# Patient Record
Sex: FEMALE | Race: BLACK OR AFRICAN AMERICAN | NOT HISPANIC OR LATINO | Employment: FULL TIME | URBAN - METROPOLITAN AREA
[De-identification: names, ages, dates, MRNs, and addresses within clinical notes are randomized per-mention and may not be internally consistent; named-entity substitution may affect disease eponyms.]

---

## 2017-02-09 DIAGNOSIS — Z12.11 SCREENING FOR COLORECTAL CANCER: Primary | ICD-10-CM

## 2017-02-09 DIAGNOSIS — Z12.12 SCREENING FOR COLORECTAL CANCER: Primary | ICD-10-CM

## 2017-02-24 ENCOUNTER — OFFICE VISIT (OUTPATIENT)
Dept: GASTROENTEROLOGY | Facility: CLINIC | Age: 52
End: 2017-02-24
Payer: COMMERCIAL

## 2017-02-24 VITALS
DIASTOLIC BLOOD PRESSURE: 84 MMHG | HEART RATE: 79 BPM | BODY MASS INDEX: 54.53 KG/M2 | WEIGHT: 277.75 LBS | HEIGHT: 60 IN | SYSTOLIC BLOOD PRESSURE: 130 MMHG

## 2017-02-24 DIAGNOSIS — Z12.11 COLON CANCER SCREENING: Primary | ICD-10-CM

## 2017-02-24 PROCEDURE — 99499 UNLISTED E&M SERVICE: CPT | Mod: S$GLB,,, | Performed by: PHYSICIAN ASSISTANT

## 2017-02-24 PROCEDURE — 99999 PR PBB SHADOW E&M-EST. PATIENT-LVL III: CPT | Mod: PBBFAC,,, | Performed by: PHYSICIAN ASSISTANT

## 2017-02-24 RX ORDER — SODIUM, POTASSIUM,MAG SULFATES 17.5-3.13G
SOLUTION, RECONSTITUTED, ORAL ORAL
Qty: 354 ML | Refills: 0 | Status: SHIPPED | OUTPATIENT
Start: 2017-02-24 | End: 2018-01-04

## 2017-02-24 RX ORDER — LOSARTAN POTASSIUM 100 MG/1
100 TABLET ORAL
COMMUNITY
Start: 2017-01-19 | End: 2018-01-04

## 2017-02-24 NOTE — PROGRESS NOTES
Subjective:       Patient ID: Ingrid Johnson is a 52 y.o. female.    Chief Complaint: Colonoscopy    HPI   The patient presented to the GI clinic today for initial evaluation. She is here to discuss colon cancer screening. She has never had a colonoscopy. The patient denies hematochezia, melena, change in bowel habits, weight loss, change in appetite, abdominal pain, nausea, vomiting, heartburn or dysphagia. She denies a family history of colon cancer.     Review of Systems   Constitutional: Negative for fever.   HENT: Negative for hearing loss.    Eyes: Negative for visual disturbance.   Respiratory: Positive for shortness of breath (occasional due to deconditioning). Negative for cough.    Cardiovascular: Negative for chest pain.   Gastrointestinal:        As per HPI.   Genitourinary: Negative for dysuria, frequency and hematuria.   Musculoskeletal: Positive for back pain. Negative for arthralgias.   Skin: Negative for rash.   Neurological: Negative for seizures, syncope, numbness and headaches.   Hematological: Does not bruise/bleed easily.   Psychiatric/Behavioral: The patient is not nervous/anxious.        Objective:      Physical Exam   Constitutional: She is oriented to person, place, and time. Vital signs are normal. She appears well-developed and well-nourished.   HENT:   Head: Normocephalic and atraumatic.   Eyes: EOM are normal.   Neck: Normal range of motion. Neck supple. Carotid bruit is not present.   Cardiovascular: Normal rate and regular rhythm.    No murmur heard.  Pulmonary/Chest: Effort normal and breath sounds normal. No respiratory distress. She has no wheezes.   Abdominal: Soft. Normal appearance and bowel sounds are normal. She exhibits no distension and no mass. There is no tenderness.   Musculoskeletal: She exhibits no edema.   Neurological: She is alert and oriented to person, place, and time. No cranial nerve deficit.   Skin: Skin is warm and dry. No rash noted.   Psychiatric: Her  behavior is normal.       Assessment:       1. Colon cancer screening        Plan:       1. Colonoscopy.   The risks, benefits, alternatives and possible complications of the above procedure(s) were discussed with the patient to include but not limited to infection, bleeding, heart complications, respiratory complications, or perforation which may require surgical intervention. The patient's questions were answered. The patient verbally reported understanding of the discussion.  2. Further recommendations after above.   3. Follow up with PCP as previously scheduled.     Thank you for the opportunity to participate in the care of this patient. This consult was designated to me by my supervising physician. A report will be sent to the referring provider.   Jung Hernández PA-C.

## 2017-02-24 NOTE — PATIENT INSTRUCTIONS
Colonoscopy     A camera attached to a flexible tube with a viewing lens is used to take video pictures.     Colonoscopy is a test to view the inside of your lower digestive tract (colon and rectum). Sometimes it can show the last part of the small intestine (ileum). During the test, small pieces of tissue may be removed for testing. This is called a biopsy. Small growths, such as polyps, may also be removed.   Why is colonoscopy done?  The test is done to help look for colon cancer. And it can help find the source of abdominal pain, bleeding, and changes in bowel habits. It may be needed once a year, depending on factors such as your:  · Age  · Health history  · Family health history  · Symptoms  · Results from any prior colonoscopy  Risks and possible complications  These include:  · Bleeding               · A puncture or tear in the colon   · Risks of anesthesia  · A cancer lesion not being seen  Getting ready   To prepare for the test:  · Talk with your healthcare provider about the risks of the test (see below). Also ask your healthcare provider about alternatives to the test.  · Tell your healthcare provider about any medicines you take. Also tell him or her about any health conditions you may have.  · Make sure your rectum and colon are empty for the test. Follow the diet and bowel prep instructions exactly. If you dont, the test may need to be rescheduled.  · Plan for a friend or family member to drive you home after the test.     Colonoscopy provides an inside view of the entire colon.     You may discuss the results with your doctor right away or at a future visit.  During the test   The test is usually done in the hospital on an outpatient basis. This means you go home the same day. The procedure takes about 30 minutes. During that time:  · You are given relaxing (sedating) medicine through an IV line. You may be drowsy, or fully asleep.  · The healthcare provider will first give you a physical exam to  check for anal and rectal problems.  · Then the anus is lubricated and the scope inserted.  · If you are awake, you may have a feeling similar to needing to have a bowel movement. You may also feel pressure as air is pumped into the colon. Its OK to pass gas during the procedure.  · Biopsy, polyp removal, or other treatments may be done during the test.  After the test   You may have gas right after the test. It can help to try to pass it to help prevent later bloating. Your healthcare provider may discuss the results with you right away. Or you may need to schedule a follow-up visit to talk about the results. After the test, you can go back to your normal eating and other activities. You may be tired from the sedation and need to rest for a few hours.  Date Last Reviewed: 11/1/2016  © 8979-0601 [employer]. [address] [place] [address], [place] [address]. All rights reserved. This information is not intended as a substitute for professional medical care. Always follow your healthcare professional's instructions.

## 2017-02-24 NOTE — MR AVS SNAPSHOT
[facility]  [address] [facility]  [place] [address]  Phone: [phone]  Fax: [phone]                  Ingrid Johnson   2017 9:30 AM   Office Visit    Description:  Female : 1965   Provider:  Jung Hernández PA-C   Department:  [facility]           Reason for Visit     Colonoscopy           Diagnoses this Visit        Comments    Colon cancer screening    -  Primary            To Do List           Goals (5 Years of Data)     None      Follow-Up and Disposition     Return if symptoms worsen or fail to improve.       These Medications        Disp Refills Start End    SUPREP BOWEL PREP KIT 17.5-3.13-1.6 gram SolR 354 mL 0 2017     Use as directed    Pharm[place]: [address] [place] [id number] [facility] #: [phone]         [facility]     [facility] Care Line -  Assistance  Registered nurses in the [facility] provide clinical advisement, health education, appointment booking, and other advisory services.  Call for this free service at [phone].             Medications           START taking these NEW medications        Refills    SUPREP BOWEL PREP KIT 17.5-3.13-1.6 gram SolR 0    Sig: Use as directed    Class: Normal           Verify that the below list of medications is an accurate representation of the medications you are currently taking.  If none reported, the list may be blank. If incorrect, please contact your healthcare provider. Carry this list with you in case of emergency.           Current Medications     atenolol-chlorthalidone (TENORETIC) 50-25 mg Tab Take 1 tablet by mouth every morning.     furosemide (LASIX) 20 MG tablet Take 20 mg by mouth 2 (two) times daily.    losartan (COZAAR) 100 MG tablet Take 100 mg by mouth.    SUPREP BOWEL PREP KIT 17.5-3.13-1.6 gram SolR Use as directed           Clinical Reference Information           Your Vitals Were     BP                    130/84           Blood Pressure          Most Recent Value    BP  130/84      Allergies as of 2/24/2017     No Known Allergies      Immunizations Administered on Date of Encounter - 2/24/2017     None      Instructions      Colonoscopy     A camera attached to a flexible tube with a viewing lens is used to take video pictures.     Colonoscopy is a test to view the inside of your lower digestive tract (colon and rectum). Sometimes it can show the last part of the small intestine (ileum). During the test, small pieces of tissue may be removed for testing. This is called a biopsy. Small growths, such as polyps, may also be removed.   Why is colonoscopy done?  The test is done to help look for colon cancer. And it can help find the source of abdominal pain, bleeding, and changes in bowel habits. It may be needed once a year, depending on factors such as your:  · Age  · Health history  · Family health history  · Symptoms  · Results from any prior colonoscopy  Risks and possible complications  These include:  · Bleeding               · A puncture or tear in the colon   · Risks of anesthesia  · A cancer lesion not being seen  Getting ready   To prepare for the test:  · Talk with your healthcare provider about the risks of the test (see below). Also ask your healthcare provider about alternatives to the test.  · Tell your healthcare provider about any medicines you take. Also tell him or her about any health conditions you may have.  · Make sure your rectum and colon are empty for the test. Follow the diet and bowel prep instructions exactly. If you dont, the test may need to be rescheduled.  · Plan for a friend or family member to drive you home after the test.     Colonoscopy provides an inside view of the entire colon.     You may discuss the results with your doctor right away or at a future visit.  During the test   The test is usually done in the hospital on an outpatient basis. This means you go  home the same day. The procedure takes about 30 minutes. During that time:  · You are given relaxing (sedating) medicine through an IV line. You may be drowsy, or fully asleep.  · The healthcare provider will first give you a physical exam to check for anal and rectal problems.  · Then the anus is lubricated and the scope inserted.  · If you are awake, you may have a feeling similar to needing to have a bowel movement. You may also feel pressure as air is pumped into the colon. Its OK to pass gas during the procedure.  · Biopsy, polyp removal, or other treatments may be done during the test.  After the test   You may have gas right after the test. It can help to try to pass it to help prevent later bloating. Your healthcare provider may discuss the results with you right away. Or you may need to schedule a follow-up visit to talk about the results. After the test, you can go back to your normal eating and other activities. You may be tired from the sedation and need to rest for a few hours.  Date Last Reviewed: 11/1/2016  © 9532-6349 [employer]. [address] [place] [address]. All rights reserved. This information is not intended as a substitute for professional medical care. Always follow your healthcare professional's instructions.             Language Assistance Services     ATTENTION: Language assistance services are available, free of charge. Please call [phone].      ATENCIÓN: Si habla bart, tiene a florian disposición servicios gratuitos de asistencia lingüística. Llame al [phone].     [facility] Ý: N?u b?n nói Ti?ng Vi?t, có các d?ch v? h? tr? ngôn ng? mi?n phí dành cho b?n. G?i s? [phone].         [facility] complies with applicable Federal civil rights laws and does not discriminate on the basis of race, color, national origin, age, disability, or sex.

## 2017-03-08 ENCOUNTER — ANESTHESIA EVENT (OUTPATIENT)
Dept: ENDOSCOPY | Facility: HOSPITAL | Age: 52
End: 2017-03-08
Payer: COMMERCIAL

## 2017-03-08 ENCOUNTER — ANESTHESIA (OUTPATIENT)
Dept: ENDOSCOPY | Facility: HOSPITAL | Age: 52
End: 2017-03-08
Payer: COMMERCIAL

## 2017-03-08 ENCOUNTER — SURGERY (OUTPATIENT)
Age: 52
End: 2017-03-08

## 2017-03-08 ENCOUNTER — HOSPITAL ENCOUNTER (OUTPATIENT)
Facility: HOSPITAL | Age: 52
Discharge: HOME | End: 2017-03-08
Attending: INTERNAL MEDICINE | Admitting: INTERNAL MEDICINE
Payer: COMMERCIAL

## 2017-03-08 VITALS
BODY MASS INDEX: 53.2 KG/M2 | OXYGEN SATURATION: 98 % | WEIGHT: 271 LBS | DIASTOLIC BLOOD PRESSURE: 68 MMHG | SYSTOLIC BLOOD PRESSURE: 104 MMHG | TEMPERATURE: 98 F | HEART RATE: 58 BPM | RESPIRATION RATE: 16 BRPM | HEIGHT: 60 IN

## 2017-03-08 DIAGNOSIS — Z90.710 STATUS POST LAPAROSCOPIC HYSTERECTOMY: ICD-10-CM

## 2017-03-08 DIAGNOSIS — I10 ESSENTIAL HYPERTENSION: ICD-10-CM

## 2017-03-08 DIAGNOSIS — Z12.11 COLON CANCER SCREENING: Primary | ICD-10-CM

## 2017-03-08 PROCEDURE — 63600175 PHARM REV CODE 636 W HCPCS: Performed by: NURSE ANESTHETIST, CERTIFIED REGISTERED

## 2017-03-08 PROCEDURE — 25000003 PHARM REV CODE 250: Performed by: INTERNAL MEDICINE

## 2017-03-08 PROCEDURE — G0121 COLON CA SCRN NOT HI RSK IND: HCPCS | Mod: ,,, | Performed by: INTERNAL MEDICINE

## 2017-03-08 PROCEDURE — 25000003 PHARM REV CODE 250: Performed by: NURSE ANESTHETIST, CERTIFIED REGISTERED

## 2017-03-08 PROCEDURE — G0121 COLON CA SCRN NOT HI RSK IND: HCPCS | Performed by: INTERNAL MEDICINE

## 2017-03-08 PROCEDURE — 37000008 HC ANESTHESIA 1ST 15 MINUTES: Performed by: INTERNAL MEDICINE

## 2017-03-08 PROCEDURE — 37000009 HC ANESTHESIA EA ADD 15 MINS: Performed by: INTERNAL MEDICINE

## 2017-03-08 RX ORDER — SODIUM CHLORIDE, SODIUM LACTATE, POTASSIUM CHLORIDE, CALCIUM CHLORIDE 600; 310; 30; 20 MG/100ML; MG/100ML; MG/100ML; MG/100ML
INJECTION, SOLUTION INTRAVENOUS CONTINUOUS
Status: DISCONTINUED | OUTPATIENT
Start: 2017-03-08 | End: 2017-03-08 | Stop reason: HOSPADM

## 2017-03-08 RX ORDER — PROPOFOL 10 MG/ML
VIAL (ML) INTRAVENOUS
Status: DISCONTINUED | OUTPATIENT
Start: 2017-03-08 | End: 2017-03-08

## 2017-03-08 RX ORDER — GLYCOPYRROLATE 0.2 MG/ML
INJECTION INTRAMUSCULAR; INTRAVENOUS
Status: DISCONTINUED | OUTPATIENT
Start: 2017-03-08 | End: 2017-03-08

## 2017-03-08 RX ORDER — LIDOCAINE HYDROCHLORIDE 10 MG/ML
INJECTION INFILTRATION; PERINEURAL
Status: DISCONTINUED | OUTPATIENT
Start: 2017-03-08 | End: 2017-03-08

## 2017-03-08 RX ADMIN — SODIUM CHLORIDE, SODIUM LACTATE, POTASSIUM CHLORIDE, AND CALCIUM CHLORIDE: 600; 310; 30; 20 INJECTION, SOLUTION INTRAVENOUS at 01:03

## 2017-03-08 RX ADMIN — LIDOCAINE HYDROCHLORIDE 40 MG: 10 INJECTION, SOLUTION INFILTRATION; PERINEURAL at 03:03

## 2017-03-08 RX ADMIN — GLYCOPYRROLATE 0.2 MG: 0.2 INJECTION INTRAMUSCULAR; INTRAVENOUS at 03:03

## 2017-03-08 RX ADMIN — PROPOFOL 100 MG: 10 INJECTION, EMULSION INTRAVENOUS at 03:03

## 2017-03-08 NOTE — ANESTHESIA POSTPROCEDURE EVALUATION
Anesthesia Post Evaluation    Patient: Ingrid Johnson    Procedure(s) Performed: Procedure(s) (LRB):  COLONOSCOPY (N/A)    Final Anesthesia Type: MAC  Patient location during evaluation: GI PACU  Patient participation: Yes- Able to Participate  Level of consciousness: awake and alert  Post-procedure vital signs: reviewed and stable  Pain management: adequate  Airway patency: patent  PONV status at discharge: No PONV  Anesthetic complications: no      Cardiovascular status: blood pressure returned to baseline  Respiratory status: unassisted and spontaneous ventilation  Hydration status: euvolemic  Follow-up not needed.        Visit Vitals    /70    Pulse 61    Temp 36.6 °C (97.8 °F) (Oral)    Resp 16    Ht 5' (1.524 m)    Wt 122.9 kg (271 lb)    LMP 06/16/2013    SpO2 97%    Breastfeeding No    BMI 52.93 kg/m2       Pain/Noemi Score: Pain Assessment Performed: Yes (3/8/2017  3:46 PM)  Presence of Pain: non-verbal indicators absent (3/8/2017  3:46 PM)  Noemi Score: 9 (3/8/2017  3:46 PM)

## 2017-03-08 NOTE — ANESTHESIA RELEASE NOTE
Anesthesia Release from PACU Note    Patient: Ingrid Johnson    Procedure(s) Performed: Procedure(s) (LRB):  COLONOSCOPY (N/A)    Anesthesia type: MAC    Post pain: Adequate analgesia    Post assessment: no apparent anesthetic complications, tolerated procedure well and no evidence of recall    Last Vitals:   Visit Vitals    /70    Pulse 61    Temp 36.6 °C (97.8 °F) (Oral)    Resp 16    Ht 5' (1.524 m)    Wt 122.9 kg (271 lb)    LMP 06/16/2013    SpO2 97%    Breastfeeding No    BMI 52.93 kg/m2       Post vital signs: stable    Level of consciousness: awake and alert     Nausea/Vomiting: no nausea/no vomiting    Complications: none    Airway Patency: patent    Respiratory: unassisted, spontaneous ventilation    Cardiovascular: stable and blood pressure at baseline    Hydration: euvolemic

## 2017-03-08 NOTE — IP AVS SNAPSHOT
[facility]  [id number] [place] [address] [address] [place] [id number]           Patient Discharge Instructions     Our goal is to set you up for success. This packet includes information on your condition, medications, and your home care. It will help you to care for yourself so you don't get sicker and need to go back to the hospital.     Please ask your nurse if you have any questions.        There are many details to remember when preparing to leave the hospital. Here is what you will need to do:    1. Take your medicine. If you are prescribed medications, review your Medication List in the following pages. You may have new medications to  at the pharmacy and others that you'll need to stop taking. Review the instructions for how and when to take your medications. Talk with your doctor or nurses if you are unsure of what to do.     2. Go to your follow-up appointments. Specific follow-up information is listed in the following pages. Your may be contacted by a transition nurse or clinical provider about future appointments. Be sure we have all of the phone numbers to reach you, if needed. Please contact your provider's office if you are unable to make an appointment.     3. Watch for warning signs. Your doctor or nurse will give you detailed warning signs to watch for and when to call for assistance. These instructions may also include educational information about your condition. If you experience any of warning signs to your health, call your doctor.               ** Verify the list of medication(s) below is accurate and up to date. Carry this with you in case of emergency. If your medications have changed, please notify your healthcare provider.             Medication List      ASK your doctor about these medications        Additional Info                      atenolol-chlorthalidone 50-25 mg Tab   Commonly known as:  TENORETIC   Refills:  0   Dose:  1 tablet    Instructions:  Take 1 tablet by  mouth every morning.     Begin Date    AM    Noon    PM    Bedtime       furosemide 20 MG tablet   Commonly known as:  LASIX   Refills:  0   Dose:  20 mg    Instructions:  Take 20 mg by mouth 2 (two) times daily.     Begin Date    AM    Noon    PM    Bedtime       losartan 100 MG tablet   Commonly known as:  COZAAR   Refills:  0   Dose:  100 mg    Instructions:  Take 100 mg by mouth.     Begin Date    AM    Noon    PM    Bedtime       SUPREP BOWEL PREP KIT 17.5-3.13-1.6 gram Solr   Quantity:  354 mL   Refills:  0   Generic drug:  sodium,potassium,mag sulfates    Instructions:  Use as directed     Begin Date    AM    Noon    PM    Bedtime                  Please bring to all follow up appointments:    1. A copy of your discharge instructions.  2. All medicines you are currently taking in their original bottles.  3. Identification and insurance card.    Please arrive 15 minutes ahead of scheduled appointment time.    Please call 24 hours in advance if you must reschedule your appointment and/or time.            Discharge Instructions         Colonoscopy     A camera attached to a flexible tube with a viewing lens is used to take video pictures.     Colonoscopy is a test to view the inside of your lower digestive tract (colon and rectum). Sometimes it can show the last part of the small intestine (ileum). During the test, small pieces of tissue may be removed for testing. This is called a biopsy. Small growths, such as polyps, may also be removed.   Why is colonoscopy done?  The test is done to help look for colon cancer. And it can help find the source of abdominal pain, bleeding, and changes in bowel habits. It may be needed once a year, depending on factors such as your:  · Age  · Health history  · Family health history  · Symptoms  · Results from any prior colonoscopy  Risks and possible complications  These include:  · Bleeding               · A puncture or tear in the colon   · Risks of anesthesia  · A cancer  lesion not being seen  Getting ready   To prepare for the test:  · Talk with your healthcare provider about the risks of the test (see below). Also ask your healthcare provider about alternatives to the test.  · Tell your healthcare provider about any medicines you take. Also tell him or her about any health conditions you may have.  · Make sure your rectum and colon are empty for the test. Follow the diet and bowel prep instructions exactly. If you dont, the test may need to be rescheduled.  · Plan for a friend or family member to drive you home after the test.     Colonoscopy provides an inside view of the entire colon.     You may discuss the results with your doctor right away or at a future visit.  During the test   The test is usually done in the hospital on an outpatient basis. This means you go home the same day. The procedure takes about 30 minutes. During that time:  · You are given relaxing (sedating) medicine through an IV line. You may be drowsy, or fully asleep.  · The healthcare provider will first give you a physical exam to check for anal and rectal problems.  · Then the anus is lubricated and the scope inserted.  · If you are awake, you may have a feeling similar to needing to have a bowel movement. You may also feel pressure as air is pumped into the colon. Its OK to pass gas during the procedure.  · Biopsy, polyp removal, or other treatments may be done during the test.  After the test   You may have gas right after the test. It can help to try to pass it to help prevent later bloating. Your healthcare provider may discuss the results with you right away. Or you may need to schedule a follow-up visit to talk about the results. After the test, you can go back to your normal eating and other activities. You may be tired from the sedation and need to rest for a few hours.  Date Last Reviewed: 11/1/2016  © 7173-2893 [employer]. [address] [place] [address], [place] [address]. All rights  reserved. This information is not intended as a substitute for professional medical care. Always follow your healthcare professional's instructions.            Admission Information     Date & Time Provider Department CSN    3/8/2017 12:01 PM Paul Hamlin III, MD [facility] [id number]      Care Providers     Provider Role Specialty Primary office phone    Paul Hamlin III, MD Attending Provider Gastroenterology [phone]    Paul Hamlin III, MD Surgeon  Gastroenterology [phone]      Your Vitals Were     BP Pulse Temp Resp Height Weight    126/90 (BP Location: Left arm, Patient Position: Lying, BP Method: Automatic) 60 97.8 °F (36.6 °C) (Oral) 18 5' (1.524 m) 122.9 kg (271 lb)    Last Period BMI             06/16/2013 52.93 kg/m2         Recent Lab Values     No lab values to display.      Allergies as of 3/8/2017     No Known Allergies      [facility]     [facility] Nurse Care Line - 24/7 Assistance  Unless otherwise directed by your provider, please contact [facility], our nurse care line that is available for 24/7 assistance.     Registered nurses in the [facility] provide clinical advisement, health education, appointment booking, and other advisory services.  Call for this free service at [phone].        Advance Directives     An advance directive is a document which, in the event you are no longer able to make decisions for yourself, tells your healthcare team what kind of treatment you do or do not want to receive, or who you would like to make those decisions for you.  If you do not currently have an advance directive, [facility] encourages you to create one.  For more information call:  [phone]), [phone] ([phone]),  or log on to [email or web address].        Language Assistance Services     ATTENTION: Language assistance services are available, free of charge. Please call [phone].      ATENCIÓN: Patricia encinas  español, tiene a florian disposición servicios gratuitos de asistencia lingüística. Maria M al [phone].     N[facility] Ý: N?u b?n nói Ti?ng Vi?t, có các d?ch v? h? tr? ngôn ng? mi?n phí dành cho b?n. G?i s? [phone].         [facility] complies with applicable Federal civil rights laws and does not discriminate on the basis of race, color, national origin, age, disability, or sex.

## 2017-03-08 NOTE — TRANSFER OF CARE
Anesthesia Transfer of Care Note    Patient: Ingrid Johnson    Procedure(s) Performed: Procedure(s) (LRB):  COLONOSCOPY (N/A)    Patient location: GI    Anesthesia Type: MAC    Transport from OR: Transported from OR on room air with adequate spontaneous ventilation    Post pain: adequate analgesia    Post assessment: no apparent anesthetic complications    Post vital signs: stable    Level of consciousness: awake    Nausea/Vomiting: no nausea/vomiting    Complications: none          Last vitals:   Visit Vitals    /70    Pulse 61    Temp 36.6 °C (97.8 °F) (Oral)    Resp 16    Ht 5' (1.524 m)    Wt 122.9 kg (271 lb)    LMP 06/16/2013    SpO2 97%    Breastfeeding No    BMI 52.93 kg/m2

## 2017-03-08 NOTE — DISCHARGE INSTRUCTIONS
Colonoscopy     A camera attached to a flexible tube with a viewing lens is used to take video pictures.     Colonoscopy is a test to view the inside of your lower digestive tract (colon and rectum). Sometimes it can show the last part of the small intestine (ileum). During the test, small pieces of tissue may be removed for testing. This is called a biopsy. Small growths, such as polyps, may also be removed.   Why is colonoscopy done?  The test is done to help look for colon cancer. And it can help find the source of abdominal pain, bleeding, and changes in bowel habits. It may be needed once a year, depending on factors such as your:  · Age  · Health history  · Family health history  · Symptoms  · Results from any prior colonoscopy  Risks and possible complications  These include:  · Bleeding               · A puncture or tear in the colon   · Risks of anesthesia  · A cancer lesion not being seen  Getting ready   To prepare for the test:  · Talk with your healthcare provider about the risks of the test (see below). Also ask your healthcare provider about alternatives to the test.  · Tell your healthcare provider about any medicines you take. Also tell him or her about any health conditions you may have.  · Make sure your rectum and colon are empty for the test. Follow the diet and bowel prep instructions exactly. If you dont, the test may need to be rescheduled.  · Plan for a friend or family member to drive you home after the test.     Colonoscopy provides an inside view of the entire colon.     You may discuss the results with your doctor right away or at a future visit.  During the test   The test is usually done in the hospital on an outpatient basis. This means you go home the same day. The procedure takes about 30 minutes. During that time:  · You are given relaxing (sedating) medicine through an IV line. You may be drowsy, or fully asleep.  · The healthcare provider will first give you a physical exam to  check for anal and rectal problems.  · Then the anus is lubricated and the scope inserted.  · If you are awake, you may have a feeling similar to needing to have a bowel movement. You may also feel pressure as air is pumped into the colon. Its OK to pass gas during the procedure.  · Biopsy, polyp removal, or other treatments may be done during the test.  After the test   You may have gas right after the test. It can help to try to pass it to help prevent later bloating. Your healthcare provider may discuss the results with you right away. Or you may need to schedule a follow-up visit to talk about the results. After the test, you can go back to your normal eating and other activities. You may be tired from the sedation and need to rest for a few hours.  Date Last Reviewed: 11/1/2016  © 4554-3365 [employer]. [address] [place] [address], [place] [address]. All rights reserved. This information is not intended as a substitute for professional medical care. Always follow your healthcare professional's instructions.

## 2017-03-08 NOTE — ANESTHESIA PREPROCEDURE EVALUATION
03/08/2017  Ingrid Johnson is a 52 y.o., female.    [facility] Anesthesia Evaluation    I have reviewed the Patient Summary Reports.     I have reviewed the Medications.     Review of Systems  Anesthesia Hx:  No problems with previous Anesthesia    Cardiovascular:   Hypertension, well controlled    Pulmonary:   Sleep Apnea, CPAP           Anesthesia Plan  Type of Anesthesia, risks & benefits discussed:  Anesthesia Type:  MAC  Patient's Preference:   Intra-op Monitoring Plan:   Intra-op Monitoring Plan Comments:   Post Op Pain Control Plan:   Post Op Pain Control Plan Comments:   Induction:    Beta Blocker:  Patient is not currently on a Beta-Blocker (No further documentation required).       Informed Consent: Patient understands risks and agrees with Anesthesia plan.  Questions answered. Anesthesia consent signed with patient.  ASA Score: 2     Day of Surgery Review of History & Physical: I have interviewed and examined the patient. I have reviewed the patient's H&P dated:  There are no significant changes.          Ready For Surgery From Anesthesia Perspective.

## 2017-03-08 NOTE — INTERVAL H&P NOTE
The patient has been examined and the H&P has been reviewed:  Family History   Problem Relation Age of Onset    Breast cancer Maternal Aunt     Cancer Paternal Uncle      COLON    Arthritis Mother     Hypertension Mother     Kidney disease Father     Heart disease Father     Hypertension Father     Colon cancer Neg Hx      Past Medical History:   Diagnosis Date    Hypertension     Low back pain     Obesity     Sleep apnea     questionable--snores     Past Surgical History:   Procedure Laterality Date     SECTION, LOW TRANSVERSE      x 4; 1 possible classical c section    HYSTERECTOMY      Left breast lumpectomy (benign, 15yrs old)      OOPHORECTOMY      LSO     Social History     Social History    Marital status:      Spouse name: N/A    Number of children: N/A    Years of education: N/A     Occupational History    Not on file.     Social History Main Topics    Smoking status: Never Smoker    Smokeless tobacco: Not on file    Alcohol use No    Drug use: No    Sexual activity: Yes     Other Topics Concern    Not on file     Social History Narrative         Review of patient's allergies indicates:  No Known Allergies  No current facility-administered medications on file prior to encounter.      Current Outpatient Prescriptions on File Prior to Encounter   Medication Sig Dispense Refill    atenolol-chlorthalidone (TENORETIC) 50-25 mg Tab Take 1 tablet by mouth every morning.       furosemide (LASIX) 20 MG tablet Take 20 mg by mouth 2 (two) times daily.             Anesthesia/Surgery risks, benefits and alternative options discussed and understood by patient/family.          There are no hospital problems to display for this patient.

## 2017-03-08 NOTE — INTERVAL H&P NOTE
The patient has been examined and the H&P has been reviewed:I have reviewed this note and I agree with this assessment. The patient was seen in the GI office and remains stable for endoscopy at the time of this present evaluation.       Anesthesia/Surgery risks, benefits and alternative options discussed and understood by patient/family.          There are no hospital problems to display for this patient.

## 2017-03-22 ENCOUNTER — PATIENT MESSAGE (OUTPATIENT)
Dept: GASTROENTEROLOGY | Facility: CLINIC | Age: 52
End: 2017-03-22

## 2017-04-14 ENCOUNTER — HOSPITAL ENCOUNTER (EMERGENCY)
Facility: HOSPITAL | Age: 52
Discharge: HOME | End: 2017-04-14
Payer: COMMERCIAL

## 2017-04-14 VITALS
RESPIRATION RATE: 20 BRPM | BODY MASS INDEX: 53.01 KG/M2 | OXYGEN SATURATION: 95 % | SYSTOLIC BLOOD PRESSURE: 189 MMHG | HEIGHT: 60 IN | WEIGHT: 270 LBS | HEART RATE: 93 BPM | TEMPERATURE: 99 F | DIASTOLIC BLOOD PRESSURE: 103 MMHG

## 2017-04-14 DIAGNOSIS — H10.32 ACUTE BACTERIAL CONJUNCTIVITIS OF LEFT EYE: ICD-10-CM

## 2017-04-14 DIAGNOSIS — R05.9 COUGH: ICD-10-CM

## 2017-04-14 DIAGNOSIS — J01.20 ACUTE NON-RECURRENT ETHMOIDAL SINUSITIS: Primary | ICD-10-CM

## 2017-04-14 PROCEDURE — 99284 EMERGENCY DEPT VISIT MOD MDM: CPT | Mod: 25

## 2017-04-14 PROCEDURE — 63600175 PHARM REV CODE 636 W HCPCS: Performed by: NURSE PRACTITIONER

## 2017-04-14 PROCEDURE — 96372 THER/PROPH/DIAG INJ SC/IM: CPT

## 2017-04-14 RX ORDER — NEOMYCIN/POLYMYXIN B/HYDROCORT 3.5-10K-1
1 SUSPENSION, DROPS(FINAL DOSAGE FORM)(ML) OPHTHALMIC (EYE) EVERY 4 HOURS
Qty: 7.5 ML | Refills: 0 | Status: SHIPPED | OUTPATIENT
Start: 2017-04-14 | End: 2018-01-04

## 2017-04-14 RX ORDER — FLUTICASONE PROPIONATE 50 MCG
1 SPRAY, SUSPENSION (ML) NASAL 2 TIMES DAILY PRN
Qty: 15 G | Refills: 0 | Status: SHIPPED | OUTPATIENT
Start: 2017-04-14 | End: 2018-01-04

## 2017-04-14 RX ORDER — DEXAMETHASONE SODIUM PHOSPHATE 4 MG/ML
8 INJECTION, SOLUTION INTRA-ARTICULAR; INTRALESIONAL; INTRAMUSCULAR; INTRAVENOUS; SOFT TISSUE
Status: COMPLETED | OUTPATIENT
Start: 2017-04-14 | End: 2017-04-14

## 2017-04-14 RX ORDER — AMOXICILLIN AND CLAVULANATE POTASSIUM 875; 125 MG/1; MG/1
1 TABLET, FILM COATED ORAL 2 TIMES DAILY
Qty: 14 TABLET | Refills: 0 | Status: SHIPPED | OUTPATIENT
Start: 2017-04-14 | End: 2018-01-04

## 2017-04-14 RX ORDER — PROMETHAZINE HYDROCHLORIDE AND DEXTROMETHORPHAN HYDROBROMIDE 6.25; 15 MG/5ML; MG/5ML
5 SYRUP ORAL 3 TIMES DAILY PRN
Qty: 120 ML | Refills: 0 | Status: SHIPPED | OUTPATIENT
Start: 2017-04-14 | End: 2017-04-24

## 2017-04-14 RX ADMIN — DEXAMETHASONE SODIUM PHOSPHATE 8 MG: 4 INJECTION, SOLUTION INTRAMUSCULAR; INTRAVENOUS at 07:04

## 2017-04-14 NOTE — ED AVS SNAPSHOT
[facility] -   [address]  [place] [address]               Ingrid Johnson   2017  6:56 PM   ED    Description:  Female : 1965   Department:  [facility]           Your Care was Coordinated By:     Provider Role From To    Royal Pepper NP Nurse Practitioner 17 [id number] --      Reason for Visit     Nasal Congestion           Diagnoses this Visit        Comments    Acute non-recurrent ethmoidal sinusitis    -  Primary     Cough         Acute bacterial conjunctivitis of left eye           ED Disposition     None           To Do List           Follow-up Information     Follow up with Ember Perez MD In 2 days.    Specialty:  Family Medicine    Contact information:    [address] [facility]  [address]  [place] [address] [phone]         These Medications        Disp Refills Start End    fluticasone (FLONASE) 50 mcg/actuation nasal spray 15 g 0 2017     1 spray by Each Nare route 2 (two) times daily as needed. - Each Nare    Pharmacy: [address] [address] [facility] Ph #: [phone]       amoxicillin-clavulanate 875-125mg (AUGMENTIN) 875-125 mg per tablet 14 tablet 0 2017     Take 1 tablet by mouth 2 (two) times daily. - Oral    Pharmacy: [address] [address] [facility] Ph #: [phone]       promethazine-dextromethorphan (PROMETHAZINE-DM) 6.25-15 mg/5 mL Syrp 120 mL 0 2017    Take 5 mLs by mouth 3 (three) times daily as needed. - Oral    Pharmacy: [address] [address] [facility] Ph #: [phone]       neomycin-polymyxin-hydrocortisone (CORTISPORIN) 3.5-10,000-10 mg-unit-mg/mL ophthalmic suspension 7.5 mL 0 2017     Place 1 drop into the left eye every 4 (four) hours. - Left Eye    Pharmacy: [address] [address] [facility]   #: [phone]         [facility]     [facility] Care [facility] -  Assistance  Unless otherwise directed by your provider, please contact [facility], our nurse care line that is available for  assistance.     Registered nurses in the [facility] provide: appointment scheduling, clinical advisement, health education, and other advisory services.  Call: [phone] (toll free)               Medications           START taking these NEW medications        Refills    fluticasone (FLONASE) 50 mcg/actuation nasal spray 0    Si spray by Each Nare route 2 (two) times daily as needed.    Class: Print    Route: Each Nare    amoxicillin-clavulanate 875-125mg (AUGMENTIN) 875-125 mg per tablet 0    Sig: Take 1 tablet by mouth 2 (two) times daily.    Class: Print    Route: Oral    promethazine-dextromethorphan (PROMETHAZINE-DM) 6.25-15 mg/5 mL Syrp 0    Sig: Take 5 mLs by mouth 3 (three) times daily as needed.    Class: Print    Route: Oral    neomycin-polymyxin-hydrocortisone (CORTISPORIN) 3.5-10,000-10 mg-unit-mg/mL ophthalmic suspension 0    Sig: Place 1 drop into the left eye every 4 (four) hours.    Class: Print    Route: Left Eye      These medications were administered today        Dose Freq    dexamethasone injection 8 mg 8 mg ED 1 Time    Sig: Inject 2 mLs (8 mg total) into the muscle ED 1 Time.    Class: Normal    Route: Intramuscular           Verify that the below list of medications is an accurate representation of the medications you are currently taking.  If none reported, the list may be blank. If incorrect, please contact your healthcare provider. Carry this list with you in case of emergency.           Current Medications     amoxicillin-clavulanate 875-125mg (AUGMENTIN) 875-125 mg per tablet Take 1 tablet by mouth 2 (two) times daily.    atenolol-chlorthalidone (TENORETIC) 50-25 mg Tab Take 1 tablet by mouth every morning.     fluticasone (FLONASE) 50 mcg/actuation  nasal spray 1 spray by Each Nare route 2 (two) times daily as needed.    furosemide (LASIX) 20 MG tablet Take 20 mg by mouth 2 (two) times daily.    losartan (COZAAR) 100 MG tablet Take 100 mg by mouth.    neomycin-polymyxin-hydrocortisone (CORTISPORIN) 3.5-10,000-10 mg-unit-mg/mL ophthalmic suspension Place 1 drop into the left eye every 4 (four) hours.    promethazine-dextromethorphan (PROMETHAZINE-DM) 6.25-15 mg/5 mL Syrp Take 5 mLs by mouth 3 (three) times daily as needed.    SUPREP BOWEL PREP KIT 17.5-3.13-1.6 gram SolR Use as directed           Clinical Reference Information           Your Vitals Were     BP Pulse Temp Resp Height Weight    189/103 (BP Location: Right arm, Patient Position: Sitting) 93 98.8 °F (37.1 °C) 20 5' (1.524 m) 122.5 kg (270 lb)    Last Period SpO2 BMI          06/16/2013 95% 52.73 kg/m2        Allergies as of 4/14/2017     No Known Allergies      Immunizations Administered on Date of Encounter - 4/14/2017     None      ED Micro, Lab, POCT     None      ED Imaging Orders     Start Ordered       Status Ordering Provider    04/14/17 1914 04/14/17 1914  X-Ray Chest PA And Lateral  1 time imaging      Final result         Discharge Instructions         Sinusitis (Antibiotic Treatment)    The sinuses are air-filled spaces within the bones of the face. They connect to the inside of the nose. Sinusitis is an inflammation of the tissue lining the sinus cavity. Sinus inflammation can occur during a cold. It can also be due to allergies to pollens and other particles in the air. Sinusitis can cause symptoms of sinus congestion and fullness. A sinus infection causes fever, headache and facial pain. There is often green or yellow drainage from the nose or into the back of the throat (post-nasal drip). You have been given antibiotics to treat this condition.  Home care:  · Take the full course of antibiotics as instructed. Do not stop taking them, even if you feel better.  · Drink plenty of water,  hot tea, and other liquids. This may help thin mucus. It also may promote sinus drainage.  · Heat may help soothe painful areas of the face. Use a towel soaked in hot water. Or,  the shower and direct the hot spray onto your face. Using a vaporizer along with a menthol rub at night may also help.   · An expectorant containing guaifenesin may help thin the mucus and promote drainage from the sinuses.  · Over-the-counter decongestants may be used unless a similar medicine was prescribed. Nasal sprays work the fastest. Use one that contains phenylephrine or oxymetazoline. First blow the nose gently. Then use the spray. Do not use these medicines more often than directed on the label or symptoms may get worse. You may also use tablets containing pseudoephedrine. Avoid products that combine ingredients, because side effects may be increased. Read labels. You can also ask the pharmacist for help. (NOTE: Persons with high blood pressure should not use decongestants. They can raise blood pressure.)  · Over-the-counter antihistamines may help if allergies contributed to your sinusitis.    · Do not use nasal rinses or irrigation during an acute sinus infection, unless told to by your health care provider. Rinsing may spread the infection to other sinuses.  · Use acetaminophen or ibuprofen to control pain, unless another pain medicine was prescribed. (If you have chronic liver or kidney disease or ever had a stomach ulcer, talk with your doctor before using these medicines. Aspirin should never be used in anyone under 18 years of age who is ill with a fever. It may cause severe liver damage.)  · Don't smoke. This can worsen symptoms.  Follow-up care  Follow up with your healthcare provider or our staff if you are not improving within the next week.  When to seek medical advice  Call your healthcare provider if any of these occur:  · Facial pain or headache becoming more severe  · Stiff neck  · Unusual drowsiness or  confusion  · Swelling of the forehead or eyelids  · Vision problems, including blurred or double vision  · Fever of 100.4ºF (38ºC) or higher, or as directed by your healthcare provider  · Seizure  · Breathing problems  · Symptoms not resolving within 10 days  Date Last Reviewed: 4/13/2015  © 5213-0058 [employer]. [address] [place] [address], [place] [address]. All rights reserved. This information is not intended as a substitute for professional medical care. Always follow your healthcare professional's instructions.          Discharge References/Attachments     CONJUNCTIVITIS, BACTERIAL (ENGLISH)       [facility]  complies with applicable Federal civil rights laws and does not discriminate on the basis of race, color, national origin, age, disability, or sex.        Language Assistance Services     ATTENTION: Language assistance services are available, free of charge. Please call [phone].      ATENCIÓN: Si habla bart, tiene a florian disposición servicios gratuitos de asistencia lingüística. Llame al [phone].     CHÚ Ý: N?u b?n nói Ti?ng Vi?t, có các d?ch v? h? tr? ngôn ng? mi?n phí dành cho b?n. G?i s? [phone].

## 2017-04-15 NOTE — ED PROVIDER NOTES
SCRIBE #1 NOTE: I, My Raymond, am scribing for, and in the presence of, Royal Pepper NP. I have scribed the entire note.      History      Chief Complaint   Patient presents with    Nasal Congestion     Cough and congestion for 5 days       Review of patient's allergies indicates:  No Known Allergies     HPI   HPI    2017, 7:10 PM   History obtained from the patient      History of Present Illness: Ingrid Johnson is a 52 y.o. female patient who presents to the Emergency Department for left eye redness and itching which onset gradually a few days ago. Pt notes positive sick contact with her granddaughter who has conjunctivitis. Sx have been constant and moderate in severity. No modifying factors noted. Pt is also complaining of a productive cough, congestion, sore throat, and sinus pressure x 5 days. No other associated sx included. Pt denies any fever, chills, CP, SOB, n/v, ear pain, trouble swallowing, visual disturbance, dysuria, rash, or dizziness. No further complaints at this time.       Arrival mode: Personal vehicle      PCP: Ember Perez MD       Past Medical History:  Past Medical History:   Diagnosis Date    Hypertension     Low back pain     Obesity     Sleep apnea     questionable--snores       Past Surgical History:  Past Surgical History:   Procedure Laterality Date     SECTION, LOW TRANSVERSE      x 4; 1 possible classical c section    COLONOSCOPY N/A 3/8/2017    Procedure: COLONOSCOPY;  Surgeon: Paul Hamlin III, MD;  Location: [facility];  Service: Endoscopy;  Laterality: N/A;    HYSTERECTOMY      Left breast lumpectomy (benign, 15yrs old)      OOPHORECTOMY      LSO         Family History:  Family History   Problem Relation Age of Onset    Breast cancer Maternal Aunt     Cancer Paternal Uncle      COLON    Arthritis Mother     Hypertension Mother     Kidney disease Father     Heart disease Father     Hypertension Father     Colon cancer Neg Hx         Social History:  Social History     Social History Main Topics    Smoking status: Never Smoker    Smokeless tobacco: Unknown     Alcohol use No    Drug use: No    Sexual activity: Yes       ROS   Review of Systems   Constitutional: Negative for chills, diaphoresis and fever.   HENT: Positive for congestion, sinus pressure and sore throat. Negative for ear discharge, ear pain and trouble swallowing.    Eyes: Positive for redness (L) and itching (L). Negative for photophobia, pain, discharge and visual disturbance.   Respiratory: Positive for cough. Negative for shortness of breath.    Cardiovascular: Negative for chest pain.   Gastrointestinal: Negative for abdominal pain, blood in stool, constipation, diarrhea, nausea and vomiting.   Genitourinary: Negative for dysuria.   Musculoskeletal: Negative for back pain, neck pain and neck stiffness.   Skin: Negative for rash.   Neurological: Negative for dizziness, weakness, light-headedness, numbness and headaches.   Hematological: Does not bruise/bleed easily.     Physical Exam    Initial Vitals   BP Pulse Resp Temp SpO2   04/14/17 1847 04/14/17 1847 04/14/17 1847 04/14/17 1847 04/14/17 1847   189/103 93 20 98.8 °F (37.1 °C) 95 %      Physical Exam  Nursing Notes and Vital Signs Reviewed.  Constitutional: Patient is in no acute distress. Awake and alert. Well-developed and well-nourished.  Head: Atraumatic. Normocephalic.  Eyes: PERRL. EOM intact. Left eye conjunctival erythema and discharge. Sclera is erythematous. No scleral icterus.  Nose: Patent nares. Turbinates are normal. No drainage.   Throat: Moist mucous membranes. Posterior pharyngeal erythema. Tonsillar exudate is not present. No trismus. Normal handling of secretions. No stridor.  Neck: Supple. Full ROM. No lymphadenopathy.  Cardiovascular: Regular rate. Regular rhythm. No murmurs, rubs, or gallops. Distal pulses are 2+ and symmetric.  Pulmonary/Chest: No respiratory distress. Clear to auscultation  bilaterally. No wheezing, rales, or rhonchi. Active cough.   Abdominal: Soft and non-distended.  There is no tenderness.  No rebound, guarding, or rigidity.   Musculoskeletal: Moves all extremities. No obvious deformities. No edema. No calf tenderness.  Skin: Warm and dry.  Neurological:  Alert, awake, and appropriate.  Normal speech.  No acute focal neurological deficits are appreciated.  Psychiatric: Normal affect. Good eye contact. Appropriate in content.    ED Course    Procedures  ED Vital Signs:  Vitals:    04/14/17 1847   BP: (!) 189/103   Pulse: 93   Resp: 20   Temp: 98.8 °F (37.1 °C)   SpO2: 95%   Weight: 122.5 kg (270 lb)   Height: 5' (1.524 m)         Imaging Results:  Imaging Results         X-Ray Chest PA And Lateral (Final result) Result time:  04/14/17 20:09:16    Final result by Abram Kaur MD (04/14/17 20:09:16)    Impression:         No acute cardiopulmonary disease.            Electronically signed by: ABRAM KAUR MD  Date:     04/14/17  Time:    20:09     Narrative:    Exam: Two-view chest radiograph    Clinical History: R05 cough.  .      Findings:   The lungs are clear. The cardiac silhouette is within normal limits.                      The Emergency Provider reviewed the vital signs and test results, which are outlined above.    ED Discussion     8:15 PM: Reassessed pt at this time.  Discussed with pt all pertinent ED information and results. Discussed pt dx and plan of tx. Gave pt all f/u and return to the ED instructions. All questions and concerns were addressed at this time. Pt expresses understanding of information and instructions, and is comfortable with plan to discharge. Pt is stable for discharge.    ED Medication(s):  Medications   dexamethasone injection 8 mg (8 mg Intramuscular Given 4/14/17 1935)       Discharge Medication List as of 4/14/2017  8:15 PM      START taking these medications    Details   amoxicillin-clavulanate 875-125mg (AUGMENTIN) 875-125 mg per tablet Take 1  tablet by mouth 2 (two) times daily., Starting 4/14/2017, Until Discontinued, Print      fluticasone (FLONASE) 50 mcg/actuation nasal spray 1 spray by Each Nare route 2 (two) times daily as needed., Starting 4/14/2017, Until Discontinued, Print      neomycin-polymyxin-hydrocortisone (CORTISPORIN) 3.5-10,000-10 mg-unit-mg/mL ophthalmic suspension Place 1 drop into the left eye every 4 (four) hours., Starting 4/14/2017, Until Discontinued, Print      promethazine-dextromethorphan (PROMETHAZINE-DM) 6.25-15 mg/5 mL Syrp Take 5 mLs by mouth 3 (three) times daily as needed., Starting 4/14/2017, Until Mon 4/24/17, Print             Follow-up Information     Follow up with Ember Perez MD In 2 days.    Specialty:  Family Medicine    Contact information:    [address] [facility] [address]  [facility] [address] [phone]            Pre-hypertension/Hypertension: The pt has been informed that they may have pre-hypertension or hypertension based on a blood pressure reading in the ED. I recommend that the pt call the PCP listed on their discharge instructions or a physician of their choice this week to arrange f/u for further evaluation of possible pre-hypertension or hypertension.     Medical Decision Making    Medical Decision Making:   Clinical Tests:   Radiological Study: Ordered and Reviewed           Scribe Attestation:   Scribe #1: I performed the above scribed service and the documentation accurately describes the services I performed. I attest to the accuracy of the note.    Attending:   Physician Attestation Statement for Scribe #1: I, Royal Pepper NP, personally performed the services described in this documentation, as scribed by My Raymond, in my presence, and it is both accurate and complete.          Clinical Impression       ICD-10-CM ICD-9-CM   1. Acute non-recurrent ethmoidal sinusitis J01.20 461.2   2. Cough R05 786.2   3. Acute bacterial conjunctivitis of left eye H10.32 372.03       Disposition:    Disposition: Discharged  Condition: Stable         Royal Pepper NP  04/15/17 0234

## 2017-04-15 NOTE — DISCHARGE INSTRUCTIONS
Sinusitis (Antibiotic Treatment)    The sinuses are air-filled spaces within the bones of the face. They connect to the inside of the nose. Sinusitis is an inflammation of the tissue lining the sinus cavity. Sinus inflammation can occur during a cold. It can also be due to allergies to pollens and other particles in the air. Sinusitis can cause symptoms of sinus congestion and fullness. A sinus infection causes fever, headache and facial pain. There is often green or yellow drainage from the nose or into the back of the throat (post-nasal drip). You have been given antibiotics to treat this condition.  Home care:  · Take the full course of antibiotics as instructed. Do not stop taking them, even if you feel better.  · Drink plenty of water, hot tea, and other liquids. This may help thin mucus. It also may promote sinus drainage.  · Heat may help soothe painful areas of the face. Use a towel soaked in hot water. Or,  the shower and direct the hot spray onto your face. Using a vaporizer along with a menthol rub at night may also help.   · An expectorant containing guaifenesin may help thin the mucus and promote drainage from the sinuses.  · Over-the-counter decongestants may be used unless a similar medicine was prescribed. Nasal sprays work the fastest. Use one that contains phenylephrine or oxymetazoline. First blow the nose gently. Then use the spray. Do not use these medicines more often than directed on the label or symptoms may get worse. You may also use tablets containing pseudoephedrine. Avoid products that combine ingredients, because side effects may be increased. Read labels. You can also ask the pharmacist for help. (NOTE: Persons with high blood pressure should not use decongestants. They can raise blood pressure.)  · Over-the-counter antihistamines may help if allergies contributed to your sinusitis.    · Do not use nasal rinses or irrigation during an acute sinus infection, unless told to by  your health care provider. Rinsing may spread the infection to other sinuses.  · Use acetaminophen or ibuprofen to control pain, unless another pain medicine was prescribed. (If you have chronic liver or kidney disease or ever had a stomach ulcer, talk with your doctor before using these medicines. Aspirin should never be used in anyone under 18 years of age who is ill with a fever. It may cause severe liver damage.)  · Don't smoke. This can worsen symptoms.  Follow-up care  Follow up with your healthcare provider or our staff if you are not improving within the next week.  When to seek medical advice  Call your healthcare provider if any of these occur:  · Facial pain or headache becoming more severe  · Stiff neck  · Unusual drowsiness or confusion  · Swelling of the forehead or eyelids  · Vision problems, including blurred or double vision  · Fever of 100.4ºF (38ºC) or higher, or as directed by your healthcare provider  · Seizure  · Breathing problems  · Symptoms not resolving within 10 days  Date Last Reviewed: 4/13/2015  © 2562-0635 [employer]. [address] [place] [address], [place] [address]. All rights reserved. This information is not intended as a substitute for professional medical care. Always follow your healthcare professional's instructions.

## 2017-08-24 DIAGNOSIS — M54.50 LOW BACK PAIN: Primary | ICD-10-CM

## 2017-08-24 DIAGNOSIS — M25.562 LEFT KNEE PAIN: ICD-10-CM

## 2017-09-01 ENCOUNTER — CLINICAL SUPPORT (OUTPATIENT)
Dept: REHABILITATION | Facility: HOSPITAL | Age: 52
End: 2017-09-01
Attending: FAMILY MEDICINE
Payer: COMMERCIAL

## 2017-09-01 DIAGNOSIS — M54.50 LOW BACK PAIN OF OVER 3 MONTHS DURATION: Primary | ICD-10-CM

## 2017-09-01 PROCEDURE — 97161 PT EVAL LOW COMPLEX 20 MIN: CPT

## 2017-09-01 PROCEDURE — 97140 MANUAL THERAPY 1/> REGIONS: CPT

## 2017-09-01 PROCEDURE — 97110 THERAPEUTIC EXERCISES: CPT

## 2017-09-01 PROCEDURE — 97014 ELECTRIC STIMULATION THERAPY: CPT

## 2017-09-01 NOTE — PROGRESS NOTES
"PHYSICAL THERAPY INITIAL OUTPATIENT EVALUATION    Referring Provider:  Dr. Ember Perez    Diagnosis:       ICD-10-CM ICD-9-CM    1. Low back pain of over 3 months duration M54.5 724.2     G89.29 338.29             Orders:  Evaluate and Treat    Date of Initial Evaluation: 9/1/17      Visit # 1 of 20    SUBJECTIVE: Patient reports that she has been experiencing lower back pain for over a year and a half and she does not recall any traumatic injury which caused initial pain. She reports it would be relieved for a little bit and then come back. She reports she is able to sit for long periods of time as long as the surface is comfortable, however on certain surfaces she cannot. She reports extreme difficulty with walking for long periods and cannot even imagine walking 1 mile due to increased pain. She reports her knee pain started after her back pain and is on her L medial side.    Onset: 1 to 1 1/2 years ago  Constant/ intermittent: intermittent  Aggravating positions: standing for long period of time  Relieving positions: sidelying on L side  Pain at worst: 10/10 (makes her cry)  Pain at best: 0/10  Pain currently: 4-5/10    Goal for Pt: "just be able to do activities, go to a football game, go bowling."      OBJECTIVE    Gait: Gait trains with increased pain to R side with antalgic gait noted and wider JOHN with stiff arm swing  Posture/Structure: decreased lordosis, flexion posture    L/sp AROM:   % Pain Present (Y/N) Comments( deviations,  reversal of lordosis, decreased pain with repeated mvts/ tissue on slack) aberrant movements- juddering, painful arc/return from flexion, leonardo's sign and reversal of lumbopelvic rhythm ,angulations   FB 90 Y Leonardo's sign   RSB 75 Y    LSB 60 Y Severe pain, made patient cry and she heard a pop   RR 90 Y Minimal   LR 90 Y Minimal   BB 60 Y        Hip AROM/PROM: Hip FL impaired (could possibly be due to obesity), decreased hip EX ROM only 25%  Decreased hamstring length on L " side (60% PROM)  Knee ROM: WNL  Ankle ROM: WNL    Palpation( condition, position, mobility( hypo/hyper): tender to L piriformis and L SI joint    No tenderness to lumbar mobilizations or sacral thrust mobilizations    Tenderness to L medial hamstring/L adductors with recreation of patient's L knee pain    Strength:  Strength     L Hip Flexor (R hip FL 4+/5)      4/5   L Knee FL      5/5   L Knee Ex      4+/5   L Ankle PF      5/5   L Ankle DF      5/5          Other LE strength: Hip abductors: B 4/5              Hip extensors: B 3-/5    Neuro/Sensation:   Intact      Special Test: SLR: negative    SI compression/ distraction: negative    Sacral thrust: negative    Gaenslens: positive on L side    CHASE: positive on L side    Slump: negative    Repeated Fl: increase in pain    Repeated Ex: increase in pain more than in flexion    Function: Patient reports 46% disability based on score of the Oswestry Low back Pain questionnaire Scale.      Other score: Patient reports 42.5% disability based on score of the LEFS    ASSESSMENT:  The patient is a 52 y.o. year old female who presents to physical therapy with complaints of severe lower back pain with certain movements, increased L knee pain, and decreased ability to perform any wanted activities.  Patient's impairments include decreased core/hip strength, impaired hip ROM, impaired lumbar ROM, increased pain, hypertrophic L piriformis, possible strain to L hamstring/adductor, and obesity effecting posture and stress on joints.  These impairments are limiting patient's ability to perform daily activities, perform any social activities, and increased pain with movements throughout the day.  Patient's prognosis is fair/good.  Patient will benefit from skilled physical therapy intervention to improve core/hip strength, improve sitting posture, improve core/lumbar ROM, improve piriformis length, and improve ability to perform daily and wanted activities in order to improve  quality of life.    Co-morbidities which may impact the plan of care and potentially impede the patient's progress in therapy include: HTN, low back pain, obesity    Patients CLINICAL PRESENTATION is STABLE.     Short Term Goals:  1-4 weeks  1. I with HEP  2. Pt to increase hip and lumbar ROM to 90% to all areas with decreased pain   3. Pt to increase B hip and core strength by 1/2 grade  4, Improve standing tolerance to 30 minutes  Long Term Goals: 5-12 weeks  1.Pt to score <15% On Oswestry Low back pain disability questionnaire  2. Pt to report minimal to no LBP and minimal to no leg pain with all activities  3. Pt to demo Good core strength and endurance  4. Pt reports good self management of posture and recreation activities  5. Pt to score <15% On LEFS     TREATMENT PROVIDED:  -Manual Therapy:  10 minute soft tissue mobilization to L piriformis  -Therapeutic Exercise:  18 min: Seated glut squeezes, seated hip AB against theraband, seated hip ADD, seated hamstring/adductor stretch  -Modalities: Moist heat to L lower back/L hip x 15 min with IFC ESTIM  -Evaluation  -Education: 5 min on proper posture, body mechanics and condition    PLAN:  Patient will benefit from physical therapy (2-3) x/week for (4-6) weeks including manual therapy, therapeutic exercise, functional activities, modalities, and patient education.    Thank you for this referral.    These services are reasonable and necessary for the conditions set forth above while under my care.     [facility] Monique, PT, DPT

## 2017-09-06 ENCOUNTER — CLINICAL SUPPORT (OUTPATIENT)
Dept: REHABILITATION | Facility: HOSPITAL | Age: 52
End: 2017-09-06
Attending: FAMILY MEDICINE
Payer: COMMERCIAL

## 2017-09-06 DIAGNOSIS — M54.50 LOW BACK PAIN OF OVER 3 MONTHS DURATION: Primary | ICD-10-CM

## 2017-09-06 PROCEDURE — 97110 THERAPEUTIC EXERCISES: CPT

## 2017-09-06 PROCEDURE — 97014 ELECTRIC STIMULATION THERAPY: CPT

## 2017-09-06 PROCEDURE — 97140 MANUAL THERAPY 1/> REGIONS: CPT

## 2017-09-06 NOTE — PROGRESS NOTES
PHYSICAL THERAPY DAILY NOTE    Referring Provider:  Dr. Ember Perez    Diagnosis:       ICD-10-CM ICD-9-CM    1. Low back pain of over 3 months duration M54.5 724.2     G89.29 338.29             Orders:  Evaluate and Treat    Date of Initial Evaluation: 9/1/17      Visit # 2 of 20    BACKGROUND: Patient reports that she has been experiencing lower back pain for over a year and a half and she does not recall any traumatic injury which caused initial pain. She reports it would be relieved for a little bit and then come back. She reports she is able to sit for long periods of time as long as the surface is comfortable, however on certain surfaces she cannot. She reports extreme difficulty with walking for long periods and cannot even imagine walking 1 mile due to increased pain. She reports her knee pain started after her back pain and is on her L medial side.    SUBJECTIVE: Patient reports slight improvement in symptoms since last treatment, but not completely sure if it is better. Patient does not report that the symptoms of gotten any worse.     OBJECTIVE : Improvement noted with L trigger points to L piriformis while palpating and patient reporting improvement in symptoms this treatment vs last treatment. Noted hypertrophy of L piriformis while palpating. Also noted decreased ability to perform supine<>sit with increased time needed to perform. Continues to note antalgic and stiff gait pattern while ambulating.       ASSESSMENT:  Patient was able to find great relief with positional distraction with L lower lumbar/L SI joint able to distract to decrease pain to patient's L side. Patient also reported great relief with L hip distraction as well as manual stretching of left lumbar/thoracic muscles in order to increase muscle length and allow distraction to affected area. Noted hypertrophy to L piriformis with improvement after STM and patient also reporting improvement in tenderness to area. Patient educated on  importance of while sitting at the computer throughout the day, to insure equal weight shift to both sides and not allow increased tightening to L side.     TREATMENT PROVIDED:  -Manual Therapy:  30 min:   Soft tissue mobilization to L piriformis  Manual L hip distraction holds  Manual stretch to L lumbar and scapular muscles  Positional distraction for long holds  -Therapeutic Exercise:  8 min:   Seated thoracic/lumbar stretch  Standing thoracic/lumbar stretch  -Modalities: Moist heat to L lower back/L hip x 15 min with IFC ESTIM  -Education: 5 min on proper posture, body mechanics, work posture and condition    PLAN:  Patient will benefit from physical therapy (2-3) x/week for (4-6) weeks including manual therapy, therapeutic exercise, functional activities, modalities, and patient education.    Thank you for this referral.    These services are reasonable and necessary for the conditions set forth above while under my care.     Nolan Monique, PT, DPT

## 2017-09-08 ENCOUNTER — CLINICAL SUPPORT (OUTPATIENT)
Dept: REHABILITATION | Facility: HOSPITAL | Age: 52
End: 2017-09-08
Attending: FAMILY MEDICINE
Payer: COMMERCIAL

## 2017-09-08 DIAGNOSIS — M54.50 LOW BACK PAIN OF OVER 3 MONTHS DURATION: Primary | ICD-10-CM

## 2017-09-08 PROCEDURE — 97014 ELECTRIC STIMULATION THERAPY: CPT

## 2017-09-08 PROCEDURE — 97140 MANUAL THERAPY 1/> REGIONS: CPT

## 2017-09-08 NOTE — PROGRESS NOTES
PHYSICAL THERAPY DAILY NOTE    Referring Provider:  Dr. Ember Perez    Diagnosis:       ICD-10-CM ICD-9-CM    1. Low back pain of over 3 months duration M54.5 724.2     G89.29 338.29             Orders:  Evaluate and Treat    Date of Initial Evaluation: 9/1/17      Visit # 3 of 20    BACKGROUND: Patient reports that she has been experiencing lower back pain for over a year and a half and she does not recall any traumatic injury which caused initial pain. She reports it would be relieved for a little bit and then come back. She reports she is able to sit for long periods of time as long as the surface is comfortable, however on certain surfaces she cannot. She reports extreme difficulty with walking for long periods and cannot even imagine walking 1 mile due to increased pain. She reports her knee pain started after her back pain and is on her L medial side.    SUBJECTIVE: Patient reports improvement in symptoms and ability to go to park with her  for a little longer distance walking without any pain. Patient is still very hesitant about pushing self too much with outdoor activities due to aggravation of pain. Pain was zero at the beginning of the treatment.    OBJECTIVE : Trigger points noted to L piriformis and increased muscle hypertrophy to area with tenderness noted. Slightly less antalgic gait pattern today.       ASSESSMENT:  Patient demonstrated improved ability to perform sit<>sidelying during today's treatment with only 3/10 noted while performing- patient is still very hesitant with certain movements due to not wanting to aggravate effected area. Improvement in L piriformis muscle extensibility and pain after manual treatment and long duration positional distraction.    TREATMENT PROVIDED:  -Manual Therapy:  30 min:   Soft tissue mobilization to L piriformis  Manual L hip distraction holds  Manual stretch to L lumbar and scapular muscles  Positional distraction for long holds  MET to improve SI  movement  -Therapeutic Exercise:  5 min:   Sidelying TA activation exercises due to patient not wanting to lay on back due to increased pain  -Modalities: Moist heat to L lower back/L hip x 15 min with IFC ESTIM  -Education: 5 min on proper posture, body mechanics, work posture, easing into activities    PLAN:  Patient will benefit from physical therapy (2-3) x/week for (4-6) weeks including manual therapy, therapeutic exercise, functional activities, modalities, and patient education.    Thank you for this referral.    These services are reasonable and necessary for the conditions set forth above while under my care.     Noaln Monique, PT, DPT

## 2017-09-11 ENCOUNTER — CLINICAL SUPPORT (OUTPATIENT)
Dept: REHABILITATION | Facility: HOSPITAL | Age: 52
End: 2017-09-11
Attending: FAMILY MEDICINE
Payer: COMMERCIAL

## 2017-09-11 DIAGNOSIS — M54.50 LOW BACK PAIN OF OVER 3 MONTHS DURATION: Primary | ICD-10-CM

## 2017-09-11 PROCEDURE — 97014 ELECTRIC STIMULATION THERAPY: CPT

## 2017-09-11 PROCEDURE — 97110 THERAPEUTIC EXERCISES: CPT

## 2017-09-11 PROCEDURE — 97140 MANUAL THERAPY 1/> REGIONS: CPT

## 2017-09-11 NOTE — PROGRESS NOTES
PHYSICAL THERAPY DAILY NOTE    Referring Provider:  Dr. Ember Perez    Diagnosis:       ICD-10-CM ICD-9-CM    1. Low back pain of over 3 months duration M54.5 724.2     G89.29 338.29             Orders:  Evaluate and Treat    Date of Initial Evaluation: 9/1/17      Visit # 4 of 20    BACKGROUND: Patient reports that she has been experiencing lower back pain for over a year and a half and she does not recall any traumatic injury which caused initial pain. She reports it would be relieved for a little bit and then come back. She reports she is able to sit for long periods of time as long as the surface is comfortable, however on certain surfaces she cannot. She reports extreme difficulty with walking for long periods and cannot even imagine walking 1 mile due to increased pain. She reports her knee pain started after her back pain and is on her L medial side.    SUBJECTIVE: Patient reports increased tightness over the weekend since last therapy session, however unsure if this is due to playing with her grandchild over the weekend and picking her up and being slightly more active. Patient also reports that her R ankle is still giving her trouble and she thinks it is a possible sprain.    OBJECTIVE : Trigger points noted to L piriformis and increased muscle hypertrophy to area with tenderness noted. More today than last session. Slightly less antalgic gait pattern today.     Performed R ankle ROM with increased pain with eversion and slight increase in inversion. With resistance, increased pain into PF/INV/EV. Slight swelling noted to R lateral mal. Slight laxity to ATFL on the R vs the L side.      ASSESSMENT:  Patient reported increased tightness today vs last treatment with more trigger points noted to L piriformis, L glut med, and L TFL with patient reporting improvement after STM to all these areas. Patient reporting improvement in symptoms after hip ADD against ball with TA activation without any increase in  pain. Patient also reporting increased R ankle pain and instability while ambulating and after quick evaluation patient may have mildly sprained R ankle.    TREATMENT PROVIDED:  -Manual Therapy:  30 min:   Soft tissue mobilization to L piriformis, L glut med, L TFL with hands and foam roller  Manual L hip distraction holds  Positional distraction for long holds  -Therapeutic Exercise: 30 min:   Ankle ROM  Ankle ABCs  Ankle intrinsics with towel squeeze  Supine TA activation with 5 second holds  Supine TA activation with marches  Supine TA activation with hip ADD  Supine TA activation with hip AB against Green TB  -Modalities: Moist heat to L lower back/L hip x 15 min with IFC ESTIM  -Education: 3 min on R ankle, proper posture, easing into activities    PLAN:  Patient will benefit from physical therapy (2-3) x/week for (4-6) weeks including manual therapy, therapeutic exercise, functional activities, modalities, and patient education.    Thank you for this referral.    These services are reasonable and necessary for the conditions set forth above while under my care.     [facility] Monique, PT, DPT

## 2017-09-20 ENCOUNTER — CLINICAL SUPPORT (OUTPATIENT)
Dept: REHABILITATION | Facility: HOSPITAL | Age: 52
End: 2017-09-20
Attending: FAMILY MEDICINE
Payer: COMMERCIAL

## 2017-09-20 DIAGNOSIS — M54.50 LOW BACK PAIN OF OVER 3 MONTHS DURATION: Primary | ICD-10-CM

## 2017-09-20 PROCEDURE — 97140 MANUAL THERAPY 1/> REGIONS: CPT

## 2017-09-20 PROCEDURE — 97110 THERAPEUTIC EXERCISES: CPT

## 2017-09-20 PROCEDURE — 97014 ELECTRIC STIMULATION THERAPY: CPT

## 2017-09-20 NOTE — PROGRESS NOTES
PHYSICAL THERAPY DAILY NOTE    Referring Provider:  Dr. Ember Perez    Diagnosis:       ICD-10-CM ICD-9-CM    1. Low back pain of over 3 months duration M54.5 724.2     G89.29 338.29             Orders:  Evaluate and Treat    Date of Initial Evaluation: 9/1/17      Visit # 5 of 20    BACKGROUND: Patient reports that she has been experiencing lower back pain for over a year and a half and she does not recall any traumatic injury which caused initial pain. She reports it would be relieved for a little bit and then come back. She reports she is able to sit for long periods of time as long as the surface is comfortable, however on certain surfaces she cannot. She reports extreme difficulty with walking for long periods and cannot even imagine walking 1 mile due to increased pain. She reports her knee pain started after her back pain and is on her L medial side.    SUBJECTIVE: Patient reports that her R ankle/foot pain got worse and she is going to see her MD to get her medication for it.  Patient reports she has hardly been able to give any attention to her lower back pain due to the severity of er R foot gout flare up.    OBJECTIVE : Trigger points noted to L piriformis and increased muscle hypertrophy to area with tenderness noted. More today than last session. Slightly less antalgic gait pattern today.     Gait: noted slightly less antalgic than last time, however in the middle of a gout flare up.    ASSESSMENT:  Patient demonstrates improvement in lower back pain with ability to perform all there ex without any pain and able to perform transitional movements with minimal to no pain- used to create moderate pain with movements. Also noted patient to have improvement in L  Piriformis musculature and decreased trigger points noted.    TREATMENT PROVIDED:  -Manual Therapy:  15 min:   Soft tissue mobilization to L piriformis, L glut med  Manual L hip distraction holds  -Therapeutic Exercise: 30 min:   Supine  posterior pelvic tilts with 3-5 second holds  Supine posterior pelvic tilts with marches 3 x 15  Supine posterior pelvic tilts with hip ADD against ball  Supine posterior pelvic tilts with hip AB against Green TB  -Modalities: Moist heat to L lower back/L hip x 15 min with IFC ESTIM  -Education: 3 min on HEP, proper posture, easing into activities     PLAN:  Patient will benefit from physical therapy (2-3) x/week for (4-6) weeks including manual therapy, therapeutic exercise, functional activities, modalities, and patient education.    Thank you for this referral.    These services are reasonable and necessary for the conditions set forth above while under my care.     Nolan Monique, PT, DPT

## 2017-09-27 ENCOUNTER — CLINICAL SUPPORT (OUTPATIENT)
Dept: REHABILITATION | Facility: HOSPITAL | Age: 52
End: 2017-09-27
Attending: FAMILY MEDICINE
Payer: COMMERCIAL

## 2017-09-27 DIAGNOSIS — M54.50 LOW BACK PAIN OF OVER 3 MONTHS DURATION: Primary | ICD-10-CM

## 2017-09-27 PROCEDURE — 97110 THERAPEUTIC EXERCISES: CPT

## 2017-09-27 PROCEDURE — 97140 MANUAL THERAPY 1/> REGIONS: CPT

## 2017-09-27 NOTE — PROGRESS NOTES
PHYSICAL THERAPY DAILY NOTE    Referring Provider:  Dr. Ember Perez    Diagnosis:       ICD-10-CM ICD-9-CM    1. Low back pain of over 3 months duration M54.5 724.2     G89.29 338.29             Orders:  Evaluate and Treat    Date of Initial Evaluation: 9/1/17    Visit # 6 of 20    BACKGROUND: Patient reports that she has been experiencing lower back pain for over a year and a half and she does not recall any traumatic injury which caused initial pain. She reports it would be relieved for a little bit and then come back. She reports she is able to sit for long periods of time as long as the surface is comfortable, however on certain surfaces she cannot. She reports extreme difficulty with walking for long periods and cannot even imagine walking 1 mile due to increased pain. She reports her knee pain started after her back pain and is on her L medial side.    SUBJECTIVE: Patient reports that her back has been feeling a lot better and that her pain is mainly been concentrated to her L foot due to her recent diagnosis of gout.    OBJECTIVE : Trigger points noted to L piriformis and increased muscle hypertrophy to area with tenderness noted. More today than last session. Slightly less antalgic gait pattern today.     Gait: noted slightly less antalgic than last time    ASSESSMENT:  Patient demonstrates great improvement in ability to tolerate greater hip and core strengthening exercises without any increased pain or irritation. Also noted improvement in L piriformis trigger points.    TREATMENT PROVIDED:  -Manual Therapy:  10 min:   Soft tissue mobilization to L piriformis, L glut med  -Therapeutic Exercise: 30 min:   Supine mini bridges  Supine posterior pelvic tilts with knee curls on ball  Supine posterior pelvic tilts with hip ADD against ball  Supine posterior pelvic tilts with hip AB against Green TB  Seated trunk FL + SB with ball for stretching  -Modalities: Moist heat to L lower back/L hip x 15 min    -Education: 3 min on HEP, proper posture, easing into activities     PLAN:  Patient will benefit from physical therapy (2-3) x/week for (4-6) weeks including manual therapy, therapeutic exercise, functional activities, modalities, and patient education.    Thank you for this referral.    These services are reasonable and necessary for the conditions set forth above while under my care.     [facility] Monique, PT, DPT

## 2017-10-04 ENCOUNTER — CLINICAL SUPPORT (OUTPATIENT)
Dept: REHABILITATION | Facility: HOSPITAL | Age: 52
End: 2017-10-04
Attending: FAMILY MEDICINE
Payer: COMMERCIAL

## 2017-10-04 DIAGNOSIS — M54.50 LOW BACK PAIN OF OVER 3 MONTHS DURATION: Primary | ICD-10-CM

## 2017-10-04 PROCEDURE — 97110 THERAPEUTIC EXERCISES: CPT

## 2017-10-04 PROCEDURE — 97014 ELECTRIC STIMULATION THERAPY: CPT

## 2017-10-04 NOTE — PROGRESS NOTES
PHYSICAL THERAPY RE-EVALUATION    Referring Provider:  Dr. Ember Perez    Diagnosis:       ICD-10-CM ICD-9-CM    1. Low back pain of over 3 months duration M54.5 724.2     G89.29 338.29      Orders:  Evaluate and Treat    Date of Initial Evaluation: 9/1/17    Visit # 7 of 20 Re-evaluation    BACKGROUND: Patient reports that she has been experiencing lower back pain for over a year and a half and she does not recall any traumatic injury which caused initial pain. She reports it would be relieved for a little bit and then come back. She reports she is able to sit for long periods of time as long as the surface is comfortable, however on certain surfaces she cannot. She reports extreme difficulty with walking for long periods and cannot even imagine walking 1 mile due to increased pain. She reports her knee pain started after her back pain and is on her L medial side.    SUBJECTIVE: Patient reports that her back has been feeling better with occasional pain noted at work. However, today she started having some L medial knee pain that is new and she has not felt in awhile.    OBJECTIVE    Gait: Gait trains with increased pain to L side with antalgic gait noted and wider JOHN with stiff arm swing  Posture/Structure: decreased lordosis, flexion posture    L/sp AROM: Initial evaluation   % Pain Present (Y/N) Comments( deviations,  reversal of lordosis, decreased pain with repeated mvts/ tissue on slack) aberrant movements- juddering, painful arc/return from flexion, leonardo's sign and reversal of lumbopelvic rhythm ,angulations   FB 90 Y Leonardo's sign   RSB 75 Y    LSB 60 Y Severe pain, made patient cry and she heard a pop   RR 90 Y Minimal   LR 90 Y Minimal   BB 60 Y      L/sp AROM: Re-evaluation   % Pain Present (Y/N) Comments( deviations,  reversal of lordosis, decreased pain with repeated mvts/ tissue on slack) aberrant movements- juddering, painful arc/return from flexion, leonardo's sign and reversal of lumbopelvic  rhythm ,angulations    N     N     N     N     N    BB 75 N Could be patient's norm     Hip AROM/PROM: Hip FL impaired in sitting due to obesity     Knee ROM: WNL    Ankle ROM: WNL    Palpation( condition, position, mobility( hypo/hyper): tender to L piriformis and L SI joint, but improved since initial evaluation      Strength:  Strength     L Hip Flexor (R hip FL 5/5)      4+/5   L Knee FL (R 5/5)      5/5   L Knee Ex ( R 5/5)      5/5   L Ankle PF (R 5/5)      5/5   L Ankle DF (R 5/5)      5/5          Other LE strength: Hip abductors: B 4+/5              Hip extensors: B 4/5    Neuro/Sensation:   Intact    Function: Patient reports 46% disability based on score of the Oswestry Low back Pain questionnaire Scale on initial evaluation. Re-evaluation on 50% on Low back Oswestery.    Other score: Patient reports 42.5% disability based on score of the LEFS on initial evaluation. Re-evaluation on 58.75% on LEFS.    ASSESSMENT:  Patient came to physical therapy with severe lower back/SI pain that prevented patient from performing certain movements with moderate to severe pain and was also reporting some L medial knee pain. Therapy was focused on lower back/SI pain due to severity and patient rarely discussing any L medial knee pain. Patient's Oswestry Disability does not reflect the great improvements she has made since coming to therapy with ability to perform transitional movements without any pain, obtaining full lumbar ROM without pain, and ability to tolerate higher level core strengthening. Just today patient reporting L  Medial knee pain she has not reported since initial evaluation with next treatments focusing on decreasing this pain and continuing with core/hip strengthening.    Short Term Goals:  1-4 weeks  1. I with HEP MET  2. Pt to increase hip and lumbar ROM to 90% to all areas with decreased pain MET  3. Pt to increase B hip and core strength by 1/2 grade MET  4, Improve  standing tolerance to 30 minutes ALMOST MET  Long Term Goals: 5-12 weeks  1.Pt to score <15% On Oswestry Low back pain disability questionnaire NOT MET  2. Pt to report minimal to no LBP and minimal to no leg pain with all activities ALMOST MET  3. Pt to demo Good core strength and endurance ALMOST MET  4. Pt reports good self management of posture and recreation activities MET  5. Pt to score <15% On LEFS NOT MET    TREATMENT PROVIDED:  -Manual Therapy:  None provided today   Soft tissue mobilization to L piriformis, L glut med DNT perform today  -Therapeutic Exercise: 30 min:   Supine mini bridges  Supine posterior pelvic tilts with knee curls on ball  Supine posterior pelvic tilts with hip ADD against ball  Supine posterior pelvic tilts with hip AB against Green TB  Seated trunk FL + SB with ball for stretching   Scifit x 5 minutes for warm-up and low level strengthening  - Re-evaluation  -Modalities: Moist heat to L lower back/L hip x 15 min + IFC ESTIM  -Education: 3 min on HEP, proper posture, easing into activities     PLAN:  Patient will benefit from physical therapy (2-3) x/week for (4-6) weeks including manual therapy, therapeutic exercise, functional activities, modalities, and patient education.    Thank you for this referral.    These services are reasonable and necessary for the conditions set forth above while under my care.     Nolan Monique, PT, DPT

## 2017-10-11 ENCOUNTER — CLINICAL SUPPORT (OUTPATIENT)
Dept: REHABILITATION | Facility: HOSPITAL | Age: 52
End: 2017-10-11
Attending: FAMILY MEDICINE
Payer: COMMERCIAL

## 2017-10-11 DIAGNOSIS — M54.50 LOW BACK PAIN OF OVER 3 MONTHS DURATION: Primary | ICD-10-CM

## 2017-10-11 PROCEDURE — 97014 ELECTRIC STIMULATION THERAPY: CPT

## 2017-10-11 PROCEDURE — 97110 THERAPEUTIC EXERCISES: CPT

## 2017-10-12 NOTE — PROGRESS NOTES
PHYSICAL THERAPY DAILY NOTE    Referring Provider:  Dr. Ember Perez    Diagnosis:       ICD-10-CM ICD-9-CM    1. Low back pain of over 3 months duration M54.5 724.2     G89.29 338.29      Orders:  Evaluate and Treat    Date of Initial Evaluation: 9/1/17    Visit # 8 of 20     BACKGROUND: Patient reports that she has been experiencing lower back pain for over a year and a half and she does not recall any traumatic injury which caused initial pain. She reports it would be relieved for a little bit and then come back. She reports she is able to sit for long periods of time as long as the surface is comfortable, however on certain surfaces she cannot. She reports extreme difficulty with walking for long periods and cannot even imagine walking 1 mile due to increased pain. She reports her knee pain started after her back pain and is on her L medial side.    SUBJECTIVE: Patient reports that her back has been feeling okay with occasional minor exacerbations, but mostly has been feeling fine. She reports her L knee pain has been on and off, but is currently hurting more than her back.    OBJECTIVE    Gait: Gait trains with increased pain to L side with antalgic gait noted and wider JOHN with stiff arm swing  Posture/Structure: decreased lordosis, flexion posture    Palpation( condition, position, mobility( hypo/hyper): tender to L piriformis and L SI joint, but improved since initial evaluation    ASSESSMENT:  Patient demonstrated good tolerance to higher level leg strengthening without any report of exacerbation of pain. Also able to tolerate higher level core strengthening while maintaining neutral spine.    TREATMENT PROVIDED:  -Manual Therapy:  None provided today   Soft tissue mobilization to L piriformis, L glut med DNT perform today  -Therapeutic Exercise: 45 min:   Supine bridges + hip AB  Supine posterior pelvic tilts with knee curls on ball  Seated trunk FL + SB with ball for stretching   Scifit x 5 minutes for  warm-up and low level strengthening  Total gym 4 bands 2 min  Standing gastroc stretch  Ball squats against wall  -Modalities: Moist heat to L lower back/L hip x 15 min + IFC ESTIM  -Education: 3 min on HEP, proper posture, easing into activities     PLAN:  Patient will benefit from physical therapy (2-3) x/week for (4-6) weeks including manual therapy, therapeutic exercise, functional activities, modalities, and patient education.    Thank you for this referral.    These services are reasonable and necessary for the conditions set forth above while under my care.     Nolan Monique, PT, DPT

## 2017-10-13 ENCOUNTER — CLINICAL SUPPORT (OUTPATIENT)
Dept: REHABILITATION | Facility: HOSPITAL | Age: 52
End: 2017-10-13
Attending: FAMILY MEDICINE
Payer: COMMERCIAL

## 2017-10-13 DIAGNOSIS — M54.50 LOW BACK PAIN OF OVER 3 MONTHS DURATION: Primary | ICD-10-CM

## 2017-10-13 PROCEDURE — 97014 ELECTRIC STIMULATION THERAPY: CPT

## 2017-10-13 PROCEDURE — 97110 THERAPEUTIC EXERCISES: CPT

## 2017-10-13 NOTE — PROGRESS NOTES
PHYSICAL THERAPY DAILY NOTE    Referring Provider:  Dr. Ember Perez    Diagnosis:       ICD-10-CM ICD-9-CM    1. Low back pain of over 3 months duration M54.5 724.2     G89.29 338.29      Orders:  Evaluate and Treat    Date of Initial Evaluation: 9/1/17    Visit # 9 of 20 (re-evaluation on 11/1/17)    BACKGROUND: Patient reports that she has been experiencing lower back pain for over a year and a half and she does not recall any traumatic injury which caused initial pain. She reports it would be relieved for a little bit and then come back. She reports she is able to sit for long periods of time as long as the surface is comfortable, however on certain surfaces she cannot. She reports extreme difficulty with walking for long periods and cannot even imagine walking 1 mile due to increased pain. She reports her knee pain started after her back pain and is on her L medial side.    SUBJECTIVE: Patient reports her L knee and back have been feeling fine without any increase in pain or difficulty performing tasks.    OBJECTIVE      Gait: Gait trains with increased pain to L side with antalgic gait noted and wider JOHN with stiff arm swing  Posture/Structure: decreased lordosis, flexion posture    Palpation( condition, position, mobility( hypo/hyper): tender to L piriformis and L SI joint, but improved since initial evaluation    Ritu's: negative on L side, however tender to medial joint line    ASSESSMENT:  Patient demonstrated good tolerance to core/pelvic stability exercises without any increase in lower back pain. Patient tolerated scifit and total gym fine without any adverse symptoms, however with wall squats patient immediately felt sharp pain to L medial joint line and reporting instability and feeling like the knee needed to pop. She was able to tolerate this exercise great last session without any increase in pain. Reported improvement in symptoms at end of session with ice and ESTIM to tender L  knee.    TREATMENT PROVIDED:  -Manual Therapy:  None provided today   -Therapeutic Exercise: 45 min:   Supine bridges + hip AB  Supine posterior pelvic tilts with knee curls on ball  Seated trunk FL + SB with ball for stretching   Scifit x 8 minutes for warm-up and low level strengthening  Total gym 4 bands 3 min  Standing gastroc stretch  Ball squats against wall (painful after 1-2 reps)  Seated on ball with posterior pelvic tilts  Attempted L piriformis stretch, but unable to perform  -Modalities: 15 min: ICE + IFC ESTIM to L knee  -Education: 3 min on HEP, proper posture, easing into activities     PLAN:  Patient will benefit from physical therapy (2-3) x/week for (4-6) weeks including manual therapy, therapeutic exercise, functional activities, modalities, and patient education.    Thank you for this referral.    These services are reasonable and necessary for the conditions set forth above while under my care.     Nolan Monique, PT, DPT

## 2017-10-20 ENCOUNTER — CLINICAL SUPPORT (OUTPATIENT)
Dept: REHABILITATION | Facility: HOSPITAL | Age: 52
End: 2017-10-20
Attending: FAMILY MEDICINE
Payer: COMMERCIAL

## 2017-10-20 DIAGNOSIS — M25.562 ACUTE PAIN OF LEFT KNEE: Primary | ICD-10-CM

## 2017-10-20 PROCEDURE — 97110 THERAPEUTIC EXERCISES: CPT

## 2017-10-20 NOTE — PROGRESS NOTES
PHYSICAL THERAPY DAILY NOTE    Referring Provider:  Dr. Ember Perez    Diagnosis:       ICD-10-CM ICD-9-CM    1. Acute pain of left knee M25.562 719.46      Orders:  Evaluate and Treat    Date of Initial Evaluation: 9/1/17    Visit # 10 of 20 (re-evaluation on 11/1/17)    BACKGROUND: Patient reports that she has been experiencing lower back pain for over a year and a half and she does not recall any traumatic injury which caused initial pain. She reports it would be relieved for a little bit and then come back. She reports she is able to sit for long periods of time as long as the surface is comfortable, however on certain surfaces she cannot. She reports extreme difficulty with walking for long periods and cannot even imagine walking 1 mile due to increased pain. She reports her knee pain started after her back pain and is on her L medial side.    SUBJECTIVE: Patient still having increased pain from last treatment with reports of more difficulty gait training and patient's knee feeling unstable.    OBJECTIVE      Gait: Gait trains with increased pain to L side with antalgic gait noted and wider JOHN with stiff arm swing  Posture/Structure: decreased lordosis, flexion posture    Palpation( condition, position, mobility( hypo/hyper): tender to L piriformis and L SI joint, but improved since initial evaluation    Ritu's: negative on L side, however tender to medial joint line and very resistant to performing full motion    ASSESSMENT:  Patient demonstrated increased knee pain at beginning of session with patient L knee still exacerbated from last treatment session after 1 squat. Patient's pain, medial joint line tenderness, and reports of L knee feeling unstable seem to be consistent with possible meniscal injury- patient educated on this. Patient demonstrated good tolerance to seated and supine quad strengthening without any increase in pain.    TREATMENT PROVIDED:  -Manual Therapy:  None provided today    -Therapeutic Exercise: 45 min:   Supine SLR  Supine quad sets  Supine SAQ  Scifit x 4 minutes for warm-up and low level strengthening  Seated hip AB against TB  Seated hip ADD against ball  Standing gastroc stretch  -Modalities: none provided today  -Education: 5 min on HEP, proper posture, easing into activities     PLAN:  Patient will benefit from physical therapy (2-3) x/week for (4-6) weeks including manual therapy, therapeutic exercise, functional activities, modalities, and patient education.    Thank you for this referral.    These services are reasonable and necessary for the conditions set forth above while under my care.     Nolan Monique, PT, DPT

## 2017-10-26 NOTE — PROGRESS NOTES
PHYSICAL THERAPY DAILY NOTE    Referring Provider:  Dr. Ember Perez    Diagnosis:       ICD-10-CM ICD-9-CM    1. Acute pain of left knee M25.562 719.46      Orders:  Evaluate and Treat    Date of Initial Evaluation: 9/1/17    Visit # 11 of 20 (re-evaluation on 11/1/17)    BACKGROUND: Patient reports that she has been experiencing lower back pain for over a year and a half and she does not recall any traumatic injury which caused initial pain. She reports it would be relieved for a little bit and then come back. She reports she is able to sit for long periods of time as long as the surface is comfortable, however on certain surfaces she cannot. She reports extreme difficulty with walking for long periods and cannot even imagine walking 1 mile due to increased pain. She reports her knee pain started after her back pain and is on her L medial side.    SUBJECTIVE: Patient reports her knee is feeling a little better than last time, but is still worried about aggravated it.    OBJECTIVE      Gait: Gait trains with increased pain to L side with antalgic gait noted and wider JOHN with stiff arm swing  Posture/Structure: decreased lordosis, flexion posture    Palpation( condition, position, mobility( hypo/hyper): tender to L piriformis and L SI joint, but improved since initial evaluation    Ritu's: negative on L side, however tender to medial joint line and very resistant to performing full motion    ASSESSMENT:  Patient demonstrated good tolerance to very low level knee/hip strengthening today and higher level core strengthening without any exacerbation in pain. Patient educated that we are still in pain control for rehab and will not be performing any exercises that will increase patient's pain. However, she is still to try to perform low level knee there ex at home to maintain strength.    TREATMENT PROVIDED:  -Manual Therapy:  None provided today   -Therapeutic Exercise: 40 min:   Supine posterior pelvic  tilts  Supine TA  Hamstring stretch  Supine quad sets  Trunk FL + Sbing stretch with ball  Scifit x 5 minutes for warm-up and low level strengthening  Supine hip AB against TB  Supine hip ADD against ball  Standing gastroc stretch  -Modalities: 15 min ice + ESTIM  -Education: 5 min on HEP, proper posture, easing into activities     PLAN:  Patient will benefit from physical therapy (2-3) x/week for (4-6) weeks including manual therapy, therapeutic exercise, functional activities, modalities, and patient education.    Thank you for this referral.    These services are reasonable and necessary for the conditions set forth above while under my care.     Nolan Monique, PT, DPT

## 2017-10-27 ENCOUNTER — CLINICAL SUPPORT (OUTPATIENT)
Dept: REHABILITATION | Facility: HOSPITAL | Age: 52
End: 2017-10-27
Attending: FAMILY MEDICINE
Payer: COMMERCIAL

## 2017-10-27 DIAGNOSIS — M25.562 ACUTE PAIN OF LEFT KNEE: Primary | ICD-10-CM

## 2017-10-27 PROCEDURE — 97110 THERAPEUTIC EXERCISES: CPT

## 2017-10-27 PROCEDURE — 97014 ELECTRIC STIMULATION THERAPY: CPT

## 2018-01-04 ENCOUNTER — OFFICE VISIT (OUTPATIENT)
Dept: CARDIOLOGY | Facility: CLINIC | Age: 53
End: 2018-01-04
Payer: COMMERCIAL

## 2018-01-04 VITALS
HEIGHT: 60 IN | SYSTOLIC BLOOD PRESSURE: 110 MMHG | DIASTOLIC BLOOD PRESSURE: 76 MMHG | WEIGHT: 278.88 LBS | BODY MASS INDEX: 54.75 KG/M2 | HEART RATE: 60 BPM

## 2018-01-04 DIAGNOSIS — R94.31 ABNORMAL EKG: ICD-10-CM

## 2018-01-04 DIAGNOSIS — R06.83 SNORING: ICD-10-CM

## 2018-01-04 DIAGNOSIS — E66.9 OBESITY, UNSPECIFIED CLASSIFICATION, UNSPECIFIED OBESITY TYPE, UNSPECIFIED WHETHER SERIOUS COMORBIDITY PRESENT: ICD-10-CM

## 2018-01-04 DIAGNOSIS — I10 ESSENTIAL HYPERTENSION: Primary | ICD-10-CM

## 2018-01-04 DIAGNOSIS — Z01.818 PRE-OP EXAM: ICD-10-CM

## 2018-01-04 DIAGNOSIS — R42 DIZZINESS: ICD-10-CM

## 2018-01-04 DIAGNOSIS — Z90.710 STATUS POST LAPAROSCOPIC HYSTERECTOMY: ICD-10-CM

## 2018-01-04 PROCEDURE — 93000 ELECTROCARDIOGRAM COMPLETE: CPT | Mod: S$GLB,,, | Performed by: INTERNAL MEDICINE

## 2018-01-04 PROCEDURE — 99204 OFFICE O/P NEW MOD 45 MIN: CPT | Mod: S$GLB,,, | Performed by: INTERNAL MEDICINE

## 2018-01-04 PROCEDURE — 99999 PR PBB SHADOW E&M-EST. PATIENT-LVL III: CPT | Mod: PBBFAC,,, | Performed by: INTERNAL MEDICINE

## 2018-01-04 RX ORDER — CYCLOBENZAPRINE HCL 10 MG
10 TABLET ORAL 3 TIMES DAILY PRN
COMMUNITY

## 2018-01-04 RX ORDER — AMLODIPINE BESYLATE 2.5 MG/1
2.5 TABLET ORAL DAILY
COMMUNITY
End: 2019-08-09 | Stop reason: SDUPTHER

## 2018-01-04 RX ORDER — VALSARTAN 320 MG/1
TABLET ORAL
COMMUNITY
Start: 2017-11-04 | End: 2019-08-09

## 2018-01-04 NOTE — PROGRESS NOTES
Subjective:   Patient ID:  Ingrid Johnson is a 52 y.o. female who presents for evaluation of Dizziness and Hypertension      HPI  A 51 yo female obese strong fh cad htn hlp untreated was evaluated in [place] with hypertensive emergency facial flushing headache nausea was admitted to the hospital [facility] had ct scan of head to pelvis was r/o mi  And her meds was changed she is feeling better however gets short of breath wioth min imal exertion as well as gets dizzy with sudden changes or positional changes. She snores at nite and stops breathing. Her ekg is abnormal  Showing non specific t wave changes.   Past Medical History:   Diagnosis Date    Abnormal EKG 2018    Dizziness 2018    Hypertension     Low back pain     Obesity     Sleep apnea     questionable--snores    Snoring 2018       Past Surgical History:   Procedure Laterality Date     SECTION, LOW TRANSVERSE      x 4; 1 possible classical c section    COLONOSCOPY N/A 3/8/2017    Procedure: COLONOSCOPY;  Surgeon: Paul Hamlin III, MD;  Location[facility];  Service: Endoscopy;  Laterality: N/A;    HYSTERECTOMY      Left breast lumpectomy (benign, 15yrs old)      OOPHORECTOMY      LSO       Social History   Substance Use Topics    Smoking status: Never Smoker    Smokeless tobacco: Not on file    Alcohol use No       Family History   Problem Relation Age of Onset    Breast cancer Maternal Aunt     Cancer Paternal Uncle      COLON    Arthritis Mother     Hypertension Mother     Kidney disease Father     Heart disease Father     Hypertension Father     Colon cancer Neg Hx        Current Outpatient Prescriptions   Medication Sig    amLODIPine (NORVASC) 2.5 MG tablet Take 2.5 mg by mouth once daily.    atenolol-chlorthalidone (TENORETIC) 50-25 mg Tab Take 1 tablet by mouth every morning.     cyclobenzaprine (FLEXERIL) 10 MG tablet Take 10 mg by mouth 3 (three) times daily as needed for Muscle spasms.     furosemide (LASIX) 20 MG tablet Take 20 mg by mouth once daily.     valsartan (DIOVAN) 320 MG tablet      No current facility-administered medications for this visit.      Current Outpatient Prescriptions on File Prior to Visit   Medication Sig    atenolol-chlorthalidone (TENORETIC) 50-25 mg Tab Take 1 tablet by mouth every morning.     furosemide (LASIX) 20 MG tablet Take 20 mg by mouth once daily.     [DISCONTINUED] amoxicillin-clavulanate 875-125mg (AUGMENTIN) 875-125 mg per tablet Take 1 tablet by mouth 2 (two) times daily.    [DISCONTINUED] fluticasone (FLONASE) 50 mcg/actuation nasal spray 1 spray by Each Nare route 2 (two) times daily as needed.    [DISCONTINUED] losartan (COZAAR) 100 MG tablet Take 100 mg by mouth.    [DISCONTINUED] neomycin-polymyxin-hydrocortisone (CORTISPORIN) 3.5-10,000-10 mg-unit-mg/mL ophthalmic suspension Place 1 drop into the left eye every 4 (four) hours.    [DISCONTINUED] SUPREP BOWEL PREP KIT 17.5-3.13-1.6 gram SolR Use as directed     No current facility-administered medications on file prior to visit.        Review of patient's allergies indicates:  No Known Allergies    Review of Systems   Constitution: Positive for weakness and malaise/fatigue. Negative for diaphoresis and weight gain.   HENT: Negative for hoarse voice and nosebleeds.         Facial flushing   Eyes: Negative for double vision and visual disturbance.   Cardiovascular: Positive for dyspnea on exertion. Negative for chest pain, claudication, cyanosis, irregular heartbeat, leg swelling, near-syncope, orthopnea, palpitations, paroxysmal nocturnal dyspnea and syncope.   Respiratory: Positive for shortness of breath, sleep disturbances due to breathing and snoring. Negative for cough and hemoptysis.    Hematologic/Lymphatic: Negative for bleeding problem. Does not bruise/bleed easily.   Skin: Negative for color change and poor wound healing.   Musculoskeletal: Positive for back pain. Negative for muscle  cramps, muscle weakness and myalgias.   Gastrointestinal: Negative for bloating, abdominal pain, change in bowel habit, diarrhea, heartburn, hematemesis, hematochezia, melena and nausea.   Genitourinary: Positive for nocturia.   Neurological: Positive for excessive daytime sleepiness, dizziness and light-headedness. Negative for headaches, loss of balance and numbness.   Psychiatric/Behavioral: Negative for memory loss. The patient does not have insomnia.    Allergic/Immunologic: Negative for hives.       Objective:   Physical Exam   Constitutional: She is oriented to person, place, and time. She appears well-developed and well-nourished. She does not appear ill. No distress.   HENT:   Head: Normocephalic and atraumatic.   Eyes: EOM are normal. Pupils are equal, round, and reactive to light. No scleral icterus.   Neck: Normal range of motion. Neck supple. Normal carotid pulses, no hepatojugular reflux and no JVD present. Carotid bruit is not present. No tracheal deviation present. No thyromegaly present.   Cardiovascular: Normal rate, regular rhythm, normal heart sounds, intact distal pulses and normal pulses.  Exam reveals no gallop and no friction rub.    No murmur heard.  Pulmonary/Chest: Effort normal and breath sounds normal. No respiratory distress. She has no wheezes. She has no rhonchi. She has no rales. She exhibits no tenderness.   Abdominal: Soft. Normal appearance, normal aorta and bowel sounds are normal. She exhibits no distension, no abdominal bruit, no ascites and no pulsatile midline mass. There is no hepatomegaly. There is no tenderness.   obese   Musculoskeletal: She exhibits no edema.        Right shoulder: She exhibits no deformity.   Neurological: She is alert and oriented to person, place, and time. She has normal strength. No cranial nerve deficit. Coordination normal.   Skin: Skin is warm and dry. No rash noted. She is not diaphoretic. No cyanosis or erythema. Nails show no clubbing.    Psychiatric: She has a normal mood and affect. Her speech is normal and behavior is normal.   Nursing note and vitals reviewed.    Vitals:    01/04/18 1543   BP: 110/76   BP Location: Left arm   Patient Position: Sitting   BP Method: Large (Manual)   Pulse: 60   Weight: 126.5 kg (278 lb 14.1 oz)   Height: 5' (1.524 m)     No results found for: CHOL  No results found for: HDL  No results found for: LDLCALC  No results found for: TRIG  No results found for: CHOLHDL    Chemistry        Component Value Date/Time     06/26/2013 1044    K 4.0 06/26/2013 1044     06/26/2013 1044    CO2 24 06/26/2013 1044    BUN 17 06/26/2013 1044    CREATININE 1.0 06/26/2013 1044    GLU 88 06/26/2013 1044        Component Value Date/Time    CALCIUM 9.6 06/26/2013 1044    ESTGFRAFRICA >60 06/26/2013 1044    EGFRNONAA 59 (L) 06/26/2013 1044          Lab Results   Component Value Date    TSH 1.442 03/21/2013     No results found for: INR, PROTIME  Lab Results   Component Value Date    WBC 9.06 06/26/2013    HGB 10.0 (L) 06/26/2013    HCT 34.0 (L) 06/26/2013    MCV 69.7 (L) 06/26/2013     (H) 06/26/2013     BNP  @LABRCNTIP(BNP,BNPTRIAGEBLO)@  CrCl cannot be calculated (Patient's most recent lab result is older than the maximum 7 days allowed.).  Assessment:     1. Essential hypertension    2. Obesity, unspecified classification, unspecified obesity type, unspecified whether serious comorbidity present    3. Snoring    4. Status post laparoscopic hysterectomy    5. Abnormal EKG    6. Dizziness    htn emergency resolved will get records from [place]  Has dizziness probably from being on 2 diuretics will stop lasix   htn well controlled.  Shortness of breath multifactorial will r/o sleep apnea pulmonary htn and ischemia.  Plan:   Echo   lexiscan   Sleep consult   Stop furosemide  F/u in 1 months.  Low salt diet   Weight loss and exercise.

## 2018-01-05 ENCOUNTER — TELEPHONE (OUTPATIENT)
Dept: CARDIOLOGY | Facility: CLINIC | Age: 53
End: 2018-01-05

## 2018-01-05 DIAGNOSIS — Z01.818 PRE-OP EXAM: Primary | ICD-10-CM

## 2018-01-05 NOTE — TELEPHONE ENCOUNTER
----- Message from Ric Bautista sent at 1/5/2018 10:06 AM CST -----  Ms Johnson had EKG done on 1/4/18 and there is no order in EPIC.  Please place order in EPIC so that EKG can be read and processed.    Thanks  Ric

## 2018-01-18 ENCOUNTER — CLINICAL SUPPORT (OUTPATIENT)
Dept: CARDIOLOGY | Facility: CLINIC | Age: 53
End: 2018-01-18
Attending: INTERNAL MEDICINE
Payer: COMMERCIAL

## 2018-01-18 ENCOUNTER — OFFICE VISIT (OUTPATIENT)
Dept: PULMONOLOGY | Facility: CLINIC | Age: 53
End: 2018-01-18
Payer: COMMERCIAL

## 2018-01-18 ENCOUNTER — HOSPITAL ENCOUNTER (OUTPATIENT)
Dept: RADIOLOGY | Facility: HOSPITAL | Age: 53
Discharge: HOME | End: 2018-01-18
Attending: INTERNAL MEDICINE
Payer: COMMERCIAL

## 2018-01-18 VITALS
DIASTOLIC BLOOD PRESSURE: 80 MMHG | BODY MASS INDEX: 54.41 KG/M2 | SYSTOLIC BLOOD PRESSURE: 124 MMHG | HEIGHT: 60 IN | HEART RATE: 77 BPM | WEIGHT: 277.13 LBS | RESPIRATION RATE: 18 BRPM | OXYGEN SATURATION: 99 %

## 2018-01-18 DIAGNOSIS — R06.83 SNORING: ICD-10-CM

## 2018-01-18 DIAGNOSIS — R42 DIZZINESS: ICD-10-CM

## 2018-01-18 DIAGNOSIS — I10 ESSENTIAL HYPERTENSION: ICD-10-CM

## 2018-01-18 DIAGNOSIS — R94.31 ABNORMAL EKG: ICD-10-CM

## 2018-01-18 DIAGNOSIS — E66.01 MORBID OBESITY WITH BMI OF 50.0-59.9, ADULT: ICD-10-CM

## 2018-01-18 DIAGNOSIS — Z90.710 STATUS POST LAPAROSCOPIC HYSTERECTOMY: ICD-10-CM

## 2018-01-18 DIAGNOSIS — G47.33 OSA (OBSTRUCTIVE SLEEP APNEA): Primary | ICD-10-CM

## 2018-01-18 PROCEDURE — 99999 PR PBB SHADOW E&M-EST. PATIENT-LVL III: CPT | Mod: PBBFAC,,, | Performed by: NURSE PRACTITIONER

## 2018-01-18 PROCEDURE — 93015 CV STRESS TEST SUPVJ I&R: CPT | Mod: S$GLB,,, | Performed by: INTERNAL MEDICINE

## 2018-01-18 PROCEDURE — 78452 HT MUSCLE IMAGE SPECT MULT: CPT | Mod: 26,,, | Performed by: INTERNAL MEDICINE

## 2018-01-18 PROCEDURE — 93306 TTE W/DOPPLER COMPLETE: CPT | Mod: S$GLB,,, | Performed by: INTERNAL MEDICINE

## 2018-01-18 PROCEDURE — 99243 OFF/OP CNSLTJ NEW/EST LOW 30: CPT | Mod: S$GLB,,, | Performed by: NURSE PRACTITIONER

## 2018-01-18 PROCEDURE — A9502 TC99M TETROFOSMIN: HCPCS | Mod: PO

## 2018-01-18 RX ORDER — ASPIRIN 81 MG/1
81 TABLET ORAL DAILY
COMMUNITY
End: 2024-03-12

## 2018-01-18 NOTE — PROGRESS NOTES
Subjective:      Patient ID: Ingrid Johnson is a 52 y.o. female.    Chief Complaint: Snoring    Patient referred by Dr. Choudhury for shortness of breath.  Recent workup with cardiology to /o sleep apnea, pulmonary htn and ischemia.  Stress test and echocardiogram performed earlier today, results pending.   She has BAILEY in hotter weather.      Patient presents to the office today for evaluation of sleep apnea.  Patient with snoring and witnessed apneas. Patient not having problems falling asleep, but wakes up  throughout the night.  Patient does not wake up feeling refreshed in the morning.  Patient with daytime hypersomnolence.  [place] Sleepiness Scale score 14.  Patient has had symptoms for a few years. Comorbidities include BMI 54, uncontrolled HTN.    Patient Active Problem List:     Hypertension     Low back pain     Obesity     Status post laparoscopic hysterectomy     Colon cancer screening     Snoring     Abnormal EKG     Dizziness        STOP - BANG Questionnaire:     1. Snoring : Do you snore loudly ?    Yes    2. Tired : Do you often feel tired, fatigued, or sleepy during daytime? Yes    3. Observed: Has anyone observed you stop breathing during your sleep?   Yes     4. Blood pressure : Do you have or are you being treated for high blood pressure?   Yes    5. BMI :BMI more than 35 kg/m2?   Yes    6. Age : Age over 50 yr old?   Yes    7. Neck circumference: Neck circumference greater than 40 cm?   Yes    8. Gender: Gender male?   No    High risk of MARK: Yes 5 - 8  Intermediate risk of MARK: Yes 3 - 4  Low risk of MARK: Yes 0 - 2      References:   STOP Questionnaire   A Tool to Screen Patients for Obstructive Sleep Apnea: MURALI Ocampo.R.C.P.C., MICHELLE Dawson.B.B.S., Saba Gomes M.D.,Myra Coello, Ph.D., MICHELLE Fuller.B.B.S.,_ Maureen Parra.,_ Pily Portillo M.D., MURALI Guerrero.R.C.P.C.; Anesthesiology 2008; 108:812-21 Copyright © 2008, the American Society of  Anesthesiologists, Inc. [employer]          /80   Pulse 77   Resp 18   Ht 5' (1.524 m)   Wt 125.7 kg (277 lb 1.9 oz)   LMP 06/16/2013   SpO2 99%   BMI 54.12 kg/m²   Body mass index is 54.12 kg/m².    Review of Systems   Respiratory: Positive for somnolence.    Psychiatric/Behavioral: Positive for sleep disturbance.   All other systems reviewed and are negative.    Objective:      Physical Exam   Constitutional: She is oriented to person, place, and time. She appears well-developed and well-nourished.   Obese   HENT:   Head: Normocephalic and atraumatic.   Mouth/Throat: Oropharynx is clear and moist. No oropharyngeal exudate.   Mallampati Score: IV     Eyes: Right eye exhibits no discharge. Left eye exhibits no discharge.   Neck: Normal range of motion. Neck supple. No tracheal deviation present. No thyromegaly present.   Neck circumference:18.5 inches      Cardiovascular: Normal rate, regular rhythm and normal heart sounds.  Exam reveals no gallop and no friction rub.    No murmur heard.  Pulmonary/Chest: Effort normal and breath sounds normal. No stridor. No respiratory distress. She has no wheezes. She has no rales.   Abdominal: Soft. Bowel sounds are normal.   Musculoskeletal: Normal range of motion. She exhibits no edema.   Lymphadenopathy:     She has no cervical adenopathy.   Neurological: She is alert and oriented to person, place, and time. Coordination normal.   Skin: Skin is warm and dry. No rash noted.   Psychiatric: She has a normal mood and affect.     Personal Diagnostic Review          Assessment:       1. MARK (obstructive sleep apnea)    2. Morbid obesity with BMI of 50.0-59.9, adult        Outpatient Encounter Prescriptions as of 1/18/2018   Medication Sig Dispense Refill    amLODIPine (NORVASC) 2.5 MG tablet Take 2.5 mg by mouth once daily.      aspirin (ECOTRIN) 81 MG EC tablet Take 81 mg by mouth once daily.      atenolol-chlorthalidone (TENORETIC) 50-25  mg Tab Take 1 tablet by mouth every morning.       cyclobenzaprine (FLEXERIL) 10 MG tablet Take 10 mg by mouth 3 (three) times daily as needed for Muscle spasms.      valsartan (DIOVAN) 320 MG tablet        No facility-administered encounter medications on file as of 1/18/2018.      Orders Placed This Encounter   Procedures    Polysomnogram (CPAP will be added if patient meets diagnostic criteria.)     Standing Status:   Future     Standing Expiration Date:   1/18/2019     Plan:      Formal polysomnogram for evaluation of sleep apnea. Return to clinic when study is available for review.  Weight loss and exercise to improve overall health.      Thank you Dr. Choudhury for this consultation.

## 2018-01-18 NOTE — LETTER
January 18, 2018      Joaquin Choudhury MD  [address] [facility] [address] [place] [address]           [facility]  [address] [place] [address]  Phone: [phone]  Fax: [phone]          Patient: Ingrid Johnson   MR Number: [id number]   YOB: 1965   Date of Visit: 1/18/2018       Dear Dr. Joaquin Choudhury:    Thank you for referring Ingrid Johnson to me for evaluation. Attached you will find relevant portions of my assessment and plan of care.    If you have questions, please do not hesitate to call me. I look forward to following Ingrid Johnson along with you.    Sincerely,    Elizabeth Lejeune, SARAH    Enclosure  CC:  No Recipients    If you would like to receive this communication electronically, please contact [email or web address] or [phone] to request more information on [employer] access.    For providers and/or their staff who would like to refer a patient to [facility], please contact us through our one-stop-shop provider referral line, [facility] , at [phone].    If you feel you have received this communication in error or would no longer like to receive these types of communications, please e-mail [email or web address]

## 2018-01-19 LAB — DIASTOLIC DYSFUNCTION: NO

## 2018-01-22 ENCOUNTER — TELEPHONE (OUTPATIENT)
Dept: CARDIOLOGY | Facility: CLINIC | Age: 53
End: 2018-01-22

## 2018-01-26 LAB
DIASTOLIC DYSFUNCTION: YES
RETIRED EF AND QEF - SEE NOTES: 60 (ref 55–65)

## 2018-02-02 ENCOUNTER — HOSPITAL ENCOUNTER (OUTPATIENT)
Dept: SLEEP MEDICINE | Facility: HOSPITAL | Age: 53
Discharge: HOME | End: 2018-02-02
Attending: FAMILY MEDICINE
Payer: COMMERCIAL

## 2018-02-02 DIAGNOSIS — G47.63 SLEEP-RELATED BRUXISM: ICD-10-CM

## 2018-02-02 DIAGNOSIS — Z72.821 INADEQUATE SLEEP HYGIENE: ICD-10-CM

## 2018-02-02 DIAGNOSIS — G47.33 OSA (OBSTRUCTIVE SLEEP APNEA): Primary | ICD-10-CM

## 2018-02-02 PROCEDURE — 95811 POLYSOM 6/>YRS CPAP 4/> PARM: CPT

## 2018-02-02 PROCEDURE — 95811 POLYSOM 6/>YRS CPAP 4/> PARM: CPT | Mod: 26,,, | Performed by: PSYCHOLOGIST

## 2018-02-07 ENCOUNTER — TELEPHONE (OUTPATIENT)
Dept: PULMONOLOGY | Facility: CLINIC | Age: 53
End: 2018-02-07

## 2018-02-07 DIAGNOSIS — G47.33 OSA (OBSTRUCTIVE SLEEP APNEA): Primary | ICD-10-CM

## 2018-02-20 ENCOUNTER — OFFICE VISIT (OUTPATIENT)
Dept: SLEEP MEDICINE | Facility: CLINIC | Age: 53
End: 2018-02-20
Payer: COMMERCIAL

## 2018-02-20 VITALS
HEIGHT: 60 IN | BODY MASS INDEX: 54.58 KG/M2 | DIASTOLIC BLOOD PRESSURE: 78 MMHG | HEART RATE: 89 BPM | WEIGHT: 278 LBS | SYSTOLIC BLOOD PRESSURE: 122 MMHG | OXYGEN SATURATION: 99 % | RESPIRATION RATE: 18 BRPM

## 2018-02-20 DIAGNOSIS — G47.33 OSA (OBSTRUCTIVE SLEEP APNEA): Primary | ICD-10-CM

## 2018-02-20 DIAGNOSIS — E66.01 MORBID OBESITY WITH BMI OF 50.0-59.9, ADULT: ICD-10-CM

## 2018-02-20 PROCEDURE — 99999 PR PBB SHADOW E&M-EST. PATIENT-LVL III: CPT | Mod: PBBFAC,,, | Performed by: NURSE PRACTITIONER

## 2018-02-20 PROCEDURE — 3008F BODY MASS INDEX DOCD: CPT | Mod: S$GLB,,, | Performed by: NURSE PRACTITIONER

## 2018-02-20 PROCEDURE — 99214 OFFICE O/P EST MOD 30 MIN: CPT | Mod: S$GLB,,, | Performed by: NURSE PRACTITIONER

## 2018-02-20 NOTE — PROGRESS NOTES
Subjective:      Patient ID: Ingrid Johnson is a 53 y.o. female.    Chief Complaint: Sleep Apnea    Patient presents to the office today for evaluation of sleep apnea.  Patient with snoring and witnessed apneas. Patient not having problems falling asleep, but wakes up  throughout the night.  Patient does not wake up feeling refreshed in the morning.  Patient with daytime hypersomnolence. Patient has had symptoms for a few years. Comorbidities include BMI 54, uncontrolled HTN. She had sleep study.    Patient Active Problem List:     Hypertension     Low back pain     Obesity     Status post laparoscopic hysterectomy     Colon cancer screening     Snoring     Abnormal EKG     Dizziness     MARK (obstructive sleep apnea)     Sleep-related bruxism     Inadequate sleep hygiene              /78   Pulse 89   Resp 18   Ht 5' (1.524 m)   Wt 126.1 kg (278 lb)   LMP 06/16/2013   SpO2 99%   BMI 54.29 kg/m²   Body mass index is 54.29 kg/m².    Review of Systems   Constitutional: Negative.    HENT: Negative.    Respiratory: Positive for somnolence.    Psychiatric/Behavioral: Positive for sleep disturbance.     Objective:      Physical Exam   Constitutional: She is oriented to person, place, and time. She appears well-developed and well-nourished.   Obese   HENT:   Head: Normocephalic and atraumatic.   Mouth/Throat: Oropharynx is clear and moist.   Neck: Normal range of motion. Neck supple.   Cardiovascular: Normal rate and regular rhythm.  Exam reveals no gallop.    No murmur heard.  Pulmonary/Chest: Effort normal and breath sounds normal.   Abdominal: Soft. She exhibits no mass. There is no tenderness.   Musculoskeletal: Normal range of motion. She exhibits no edema.   Neurological: She is alert and oriented to person, place, and time.   Skin: Skin is warm and dry.   Psychiatric: She has a normal mood and affect.     Personal Diagnostic Review  The diagnostic polysomnography revealed a severe obstructive  sleep apnea / hypopnea syndrome (A + H Index = 96.9  events / hr asleep with 51.0 respiratory event - arousals / hr asleep for the study, and an additional 1.2 RERAs / hr asleep  (respiratory effort - related arousals). The mean SpO2 value was 92.4 %, moderate, minimum oxygen saturation during sleep  was 86.0 %, and waking baseline SpO2 was 95 %. Persistent, moderately loud to loud snoring was noted.  2. CPAP was initiated at 12:56 am. The titration polysomnography revealed that 15 cm H2O pressure (C - Flex 3, humidity 2),  the most effective pressure most completely tested, was optimal, as it reduced the rate of respiratory events 94 % to A + H  Index = 3.0 events / hr asleep in 2.0 hrs sleep (0.7 hrs REM sleep). Snoring was eliminated at 15 cm.       Assessment:       1. MARK (obstructive sleep apnea)    2. Morbid obesity with BMI of 50.0-59.9, adult        Outpatient Encounter Prescriptions as of 2/20/2018   Medication Sig Dispense Refill    amLODIPine (NORVASC) 2.5 MG tablet Take 2.5 mg by mouth once daily.      aspirin (ECOTRIN) 81 MG EC tablet Take 81 mg by mouth once daily.      atenolol-chlorthalidone (TENORETIC) 50-25 mg Tab Take 1 tablet by mouth every morning.       cyclobenzaprine (FLEXERIL) 10 MG tablet Take 10 mg by mouth 3 (three) times daily as needed for Muscle spasms.      valsartan (DIOVAN) 320 MG tablet        No facility-administered encounter medications on file as of 2/20/2018.      No orders of the defined types were placed in this encounter.    Plan:     CPAP 15cm H2O and   follow up in 8 weeks with download of data card and review of symptoms.      Weight loss and exercise to improve overall health.

## 2018-02-22 ENCOUNTER — OFFICE VISIT (OUTPATIENT)
Dept: CARDIOLOGY | Facility: CLINIC | Age: 53
End: 2018-02-22
Payer: COMMERCIAL

## 2018-02-22 VITALS
HEIGHT: 60 IN | BODY MASS INDEX: 54.19 KG/M2 | WEIGHT: 276 LBS | HEART RATE: 56 BPM | DIASTOLIC BLOOD PRESSURE: 78 MMHG | SYSTOLIC BLOOD PRESSURE: 106 MMHG

## 2018-02-22 DIAGNOSIS — E66.9 OBESITY, UNSPECIFIED CLASSIFICATION, UNSPECIFIED OBESITY TYPE, UNSPECIFIED WHETHER SERIOUS COMORBIDITY PRESENT: ICD-10-CM

## 2018-02-22 DIAGNOSIS — G47.33 OSA (OBSTRUCTIVE SLEEP APNEA): ICD-10-CM

## 2018-02-22 DIAGNOSIS — I10 ESSENTIAL HYPERTENSION: Primary | ICD-10-CM

## 2018-02-22 DIAGNOSIS — R06.83 SNORING: ICD-10-CM

## 2018-02-22 DIAGNOSIS — R94.31 ABNORMAL EKG: ICD-10-CM

## 2018-02-22 PROCEDURE — 99212 OFFICE O/P EST SF 10 MIN: CPT | Mod: S$GLB,,, | Performed by: INTERNAL MEDICINE

## 2018-02-22 PROCEDURE — 3008F BODY MASS INDEX DOCD: CPT | Mod: S$GLB,,, | Performed by: INTERNAL MEDICINE

## 2018-02-22 PROCEDURE — 99999 PR PBB SHADOW E&M-EST. PATIENT-LVL III: CPT | Mod: PBBFAC,,, | Performed by: INTERNAL MEDICINE

## 2018-02-22 NOTE — PROGRESS NOTES
Subjective:   Patient ID:  Ingrid Johnson is a 53 y.o. female who presents for follow up of Hypertension      HPI  A 52 yo female with obesity htn is here for f/u test results. She has severe sleep apnea she started using cpap.has still shortness of breath has lvh and normal lvf by ok and has no ischemia by cardiolite. bp is controlled compliant with salt she is still drinking sodas. She is looking into a gym.uses fluid pill prn   Past Medical History:   Diagnosis Date    Abnormal EKG 2018    Dizziness 2018    Hypertension     Low back pain     Obesity     Sleep apnea     questionable--snores    Snoring 2018       Past Surgical History:   Procedure Laterality Date     SECTION, LOW TRANSVERSE      x 4; 1 possible classical c section    COLONOSCOPY N/A 3/8/2017    Procedure: COLONOSCOPY;  Surgeon: Paul Hamlin III, MD;  Location: [facility];  Service: Endoscopy;  Laterality: N/A;    HYSTERECTOMY      Left breast lumpectomy (benign, 15yrs old)      OOPHORECTOMY      LSO       Social History   Substance Use Topics    Smoking status: Never Smoker    Smokeless tobacco: Never Used    Alcohol use No       Family History   Problem Relation Age of Onset    Breast cancer Maternal Aunt     Cancer Paternal Uncle      COLON    Arthritis Mother     Hypertension Mother     Kidney disease Father     Heart disease Father     Hypertension Father     Colon cancer Neg Hx        Current Outpatient Prescriptions   Medication Sig    amLODIPine (NORVASC) 2.5 MG tablet Take 2.5 mg by mouth once daily.    aspirin (ECOTRIN) 81 MG EC tablet Take 81 mg by mouth once daily.    atenolol-chlorthalidone (TENORETIC) 50-25 mg Tab Take 1 tablet by mouth every morning.     cyclobenzaprine (FLEXERIL) 10 MG tablet Take 10 mg by mouth 3 (three) times daily as needed for Muscle spasms.    valsartan (DIOVAN) 320 MG tablet      No current facility-administered medications for this visit.       Current Outpatient Prescriptions on File Prior to Visit   Medication Sig    amLODIPine (NORVASC) 2.5 MG tablet Take 2.5 mg by mouth once daily.    aspirin (ECOTRIN) 81 MG EC tablet Take 81 mg by mouth once daily.    atenolol-chlorthalidone (TENORETIC) 50-25 mg Tab Take 1 tablet by mouth every morning.     cyclobenzaprine (FLEXERIL) 10 MG tablet Take 10 mg by mouth 3 (three) times daily as needed for Muscle spasms.    valsartan (DIOVAN) 320 MG tablet      No current facility-administered medications on file prior to visit.      Review of patient's allergies indicates:  No Known Allergies  ROS  Positive for dyspnea shortness of breath   No edema   Objective:   Physical Exam  Vitals:    02/22/18 1544   BP: 106/78   Pulse: (!) 56   Weight: 125.2 kg (276 lb 0.3 oz)   Height: 5' (1.524 m)   no eaxmka performed  No results found for: CHOL  No results found for: HDL  No results found for: LDLCALC  No results found for: TRIG  No results found for: CHOLHDL    Chemistry        Component Value Date/Time     06/26/2013 1044    K 4.0 06/26/2013 1044     06/26/2013 1044    CO2 24 06/26/2013 1044    BUN 17 06/26/2013 1044    CREATININE 1.0 06/26/2013 1044    GLU 88 06/26/2013 1044        Component Value Date/Time    CALCIUM 9.6 06/26/2013 1044    ESTGFRAFRICA >60 06/26/2013 1044    EGFRNONAA 59 (L) 06/26/2013 1044          Lab Results   Component Value Date    TSH 1.442 03/21/2013     No results found for: INR, PROTIME  Lab Results   Component Value Date    WBC 9.06 06/26/2013    HGB 10.0 (L) 06/26/2013    HCT 34.0 (L) 06/26/2013    MCV 69.7 (L) 06/26/2013     (H) 06/26/2013     BMP  Sodium   Date Value Ref Range Status   06/26/2013 137 136 - 145 mmol/L Final     Potassium   Date Value Ref Range Status   06/26/2013 4.0 3.5 - 5.1 mmol/L Final     Chloride   Date Value Ref Range Status   06/26/2013 101 95 - 110 mmol/L Final     CO2   Date Value Ref Range Status   06/26/2013 24 23 - 29 mmol/L Final     BUN,  Bld   Date Value Ref Range Status   06/26/2013 17 6 - 20 mg/dl Final     Creatinine   Date Value Ref Range Status   06/26/2013 1.0 0.5 - 1.4 mg/dl Final     Calcium   Date Value Ref Range Status   06/26/2013 9.6 8.7 - 10.5 mg/dl Final     Anion Gap   Date Value Ref Range Status   06/26/2013 12.0 8 - 16 mmol/L Final     eGFR if    Date Value Ref Range Status   06/26/2013 >60 >60 mL/min Final     Comment:     Estimated glomerular filtration rate (eGFR) is normalized to an  average body surface area of 1.73 square meters.  The calculation  used to obtain the eGFR is the adjusted MDRD equation, which factors  patient sex, age, race, and creatinine result.  Since race is unknown  in our information system, the eGFR values for -American  and Non--American patients are given for each creatinine  result.     eGFR if non    Date Value Ref Range Status   06/26/2013 59 (L) >60 mL/min Final     CrCl cannot be calculated (Patient's most recent lab result is older than the maximum 7 days allowed.).    Assessment:     1. Essential hypertension    2. Obesity, unspecified classification, unspecified obesity type, unspecified whether serious comorbidity present    3. Snoring    4. Abnormal EKG    5. MARK (obstructive sleep apnea)      Reviewed test with ehr and discussed sleep apnea and its implication from the cardiovascular standpoint  counseled about diet exercise weight loss and salt compliance.  Plan:   Continue current therapy  Cardiac low salt diet.  Risk factor modification and excercise program weight loss ..  F/u in 6 months with lipid cmp ..

## 2018-04-18 ENCOUNTER — OFFICE VISIT (OUTPATIENT)
Dept: SLEEP MEDICINE | Facility: CLINIC | Age: 53
End: 2018-04-18
Payer: COMMERCIAL

## 2018-04-18 ENCOUNTER — HOSPITAL ENCOUNTER (OUTPATIENT)
Dept: RADIOLOGY | Facility: HOSPITAL | Age: 53
Discharge: HOME | End: 2018-04-18
Attending: NURSE PRACTITIONER
Payer: COMMERCIAL

## 2018-04-18 DIAGNOSIS — J20.9 ACUTE BRONCHITIS, UNSPECIFIED ORGANISM: ICD-10-CM

## 2018-04-18 DIAGNOSIS — G47.33 OSA (OBSTRUCTIVE SLEEP APNEA): Primary | ICD-10-CM

## 2018-04-18 PROCEDURE — 99999 PR PBB SHADOW E&M-EST. PATIENT-LVL II: CPT | Mod: PBBFAC,,, | Performed by: NURSE PRACTITIONER

## 2018-04-18 PROCEDURE — 71046 X-RAY EXAM CHEST 2 VIEWS: CPT | Mod: TC,FY,PO

## 2018-04-18 PROCEDURE — 99214 OFFICE O/P EST MOD 30 MIN: CPT | Mod: S$GLB,,, | Performed by: NURSE PRACTITIONER

## 2018-04-18 PROCEDURE — 71046 X-RAY EXAM CHEST 2 VIEWS: CPT | Mod: 26,,, | Performed by: RADIOLOGY

## 2018-04-18 RX ORDER — IBUPROFEN 600 MG/1
600 TABLET ORAL
COMMUNITY
Start: 2018-03-14 | End: 2024-03-12

## 2018-04-18 RX ORDER — PREDNISONE 20 MG/1
TABLET ORAL
Qty: 21 TABLET | Refills: 0 | Status: SHIPPED | OUTPATIENT
Start: 2018-04-18 | End: 2024-03-12

## 2018-04-18 NOTE — PROGRESS NOTES
Subjective:      Patient ID: Ingrid Johnson is a 53 y.o. female.    Chief Complaint: Sleep Apnea    Patient presents to the office today for evaluation of sleep apnea.  Patient started off feeling great on her CPAP, but developed a upper respiratory infection/pharyngitis 1 month ago.  She was seen by her primary care provider Dr. Perez. Pharyngitis resolved but she has tightness in her neck and wheezing.  She denies reflux type symptoms.  She has a dry cough.  No fevers.  She is unable to wear her CPAP due to coughing and wheezing        LMP 06/16/2013   There is no height or weight on file to calculate BMI.    Review of Systems   Respiratory: Positive for cough and wheezing.    All other systems reviewed and are negative.    Objective:      Physical Exam   Constitutional: She is oriented to person, place, and time. She appears well-developed and well-nourished.   HENT:   Head: Normocephalic and atraumatic.   Mouth/Throat: Oropharynx is clear and moist.   Neck: Normal range of motion. Neck supple.   No inspiratory stridor but expiratory wheezing   Cardiovascular: Normal rate and regular rhythm.  Exam reveals no gallop.    No murmur heard.  Pulmonary/Chest: Effort normal. She has wheezes.   High-pitched expiratory wheezing to neck which radiates to upper lobes.    Abdominal: Soft. She exhibits no mass. There is no tenderness.   Musculoskeletal: Normal range of motion. She exhibits no edema.   Neurological: She is alert and oriented to person, place, and time.   Skin: Skin is warm and dry.   Psychiatric: She has a normal mood and affect.     Personal Diagnostic Review  CPAP not able to wear nightly but normal AHI when wearing.        Assessment:       1. MARK (obstructive sleep apnea)    2. Acute bronchitis, unspecified organism        Outpatient Encounter Prescriptions as of 4/18/2018   Medication Sig Dispense Refill    amLODIPine (NORVASC) 2.5 MG tablet Take 2.5 mg by mouth once daily.      aspirin  (ECOTRIN) 81 MG EC tablet Take 81 mg by mouth once daily.      atenolol-chlorthalidone (TENORETIC) 50-25 mg Tab Take 1 tablet by mouth every morning.       cyclobenzaprine (FLEXERIL) 10 MG tablet Take 10 mg by mouth 3 (three) times daily as needed for Muscle spasms.      valsartan (DIOVAN) 320 MG tablet       ibuprofen (ADVIL,MOTRIN) 600 MG tablet Take 600 mg by mouth.       No facility-administered encounter medications on file as of 4/18/2018.      No orders of the defined types were placed in this encounter.    Plan:   Prednsione 60mg for 3 days, 40mg for 3 days, 20mg for 3 days, 10mg for 3 days, then off.  Follow up in 2 weeks.  CXR

## 2018-07-16 ENCOUNTER — PATIENT MESSAGE (OUTPATIENT)
Dept: SLEEP MEDICINE | Facility: CLINIC | Age: 53
End: 2018-07-16

## 2018-07-18 ENCOUNTER — OFFICE VISIT (OUTPATIENT)
Dept: SLEEP MEDICINE | Facility: CLINIC | Age: 53
End: 2018-07-18
Payer: COMMERCIAL

## 2018-07-18 VITALS
DIASTOLIC BLOOD PRESSURE: 76 MMHG | RESPIRATION RATE: 17 BRPM | OXYGEN SATURATION: 97 % | BODY MASS INDEX: 53.5 KG/M2 | HEIGHT: 60 IN | SYSTOLIC BLOOD PRESSURE: 128 MMHG | HEART RATE: 75 BPM | WEIGHT: 272.5 LBS

## 2018-07-18 DIAGNOSIS — G47.10 HYPERSOMNIA: ICD-10-CM

## 2018-07-18 DIAGNOSIS — G47.33 OSA (OBSTRUCTIVE SLEEP APNEA): Primary | ICD-10-CM

## 2018-07-18 PROCEDURE — 3078F DIAST BP <80 MM HG: CPT | Mod: CPTII,S$GLB,, | Performed by: NURSE PRACTITIONER

## 2018-07-18 PROCEDURE — 3008F BODY MASS INDEX DOCD: CPT | Mod: CPTII,S$GLB,, | Performed by: NURSE PRACTITIONER

## 2018-07-18 PROCEDURE — 99999 PR PBB SHADOW E&M-EST. PATIENT-LVL III: CPT | Mod: PBBFAC,,, | Performed by: NURSE PRACTITIONER

## 2018-07-18 PROCEDURE — 3074F SYST BP LT 130 MM HG: CPT | Mod: CPTII,S$GLB,, | Performed by: NURSE PRACTITIONER

## 2018-07-18 PROCEDURE — 99214 OFFICE O/P EST MOD 30 MIN: CPT | Mod: S$GLB,,, | Performed by: NURSE PRACTITIONER

## 2018-07-18 NOTE — PROGRESS NOTES
Subjective:      Patient ID: Ingrid Johnson is a 53 y.o. female.    Chief Complaint: Sleep Apnea    Patient presents to the office today for evaluation of sleep apnea.  Patient was started on sleep apnea in February, but returned machine due to noncompliance.  Patient has significant difficulty sleeping.  She is waking up gasping for air and highly anxious.  She is not sleeping well at all.  She is requesting to start back on CPAP.    STOP - BANG Questionnaire:     1. Snoring : Do you snore loudly ?    Yes    2. Tired : Do you often feel tired, fatigued, or sleepy during daytime? Yes    3. Observed: Has anyone observed you stop breathing during your sleep?   Yes     4. Blood pressure : Do you have or are you being treated for high blood pressure?   Yes    5. BMI :BMI more than 35 kg/m2?   Yes    6. Age : Age over 50 yr old?   Yes    7. Neck circumference: Neck circumference greater than 40 cm?   No    8. Gender: Gender male?   No    High risk of MARK: Yes 5 - 8  Intermediate risk of MARK: Yes 3 - 4  Low risk of MARK: Yes 0 - 2      References:   STOP Questionnaire   A Tool to Screen Patients for Obstructive Sleep Apnea: MURALI Ocampo.R.C.P.C., Zbigniew Hollins M.B.B.S., Saba Gomes M.D.,Myra Coello, Ph.D., Patrizia Carter M.B.B.S.,_ MICHELLE Parra.Sc.,_ Pily Portillo M.D., Alex Reddy F.R.C.P.C.; Anesthesiology 2008; 108:812-21 Copyright © 2008, the American Society of Anesthesiologists, Inc. [employer]          /76   Pulse 75   Resp 17   Ht 5' (1.524 m)   Wt 123.6 kg (272 lb 7.8 oz)   LMP 06/16/2013   SpO2 97%   BMI 53.22 kg/m²   Body mass index is 53.22 kg/m².    Review of Systems   Constitutional: Positive for fatigue.   Respiratory: Positive for apnea, snoring, asthma nighttime symptoms and somnolence.    Psychiatric/Behavioral: Positive for sleep disturbance. The patient is nervous/anxious.    All other systems reviewed and are  negative.    Objective:      Physical Exam   Constitutional: She is oriented to person, place, and time. She appears well-developed and well-nourished.   Obese   HENT:   Head: Normocephalic and atraumatic.   Mouth/Throat: Oropharynx is clear and moist.   Neck: Normal range of motion. Neck supple.   Cardiovascular: Normal rate and regular rhythm.  Exam reveals no gallop.    No murmur heard.  Pulmonary/Chest: Effort normal and breath sounds normal.   Abdominal: Soft. She exhibits no mass. There is no tenderness.   Musculoskeletal: Normal range of motion. She exhibits no edema.   Neurological: She is alert and oriented to person, place, and time.   Skin: Skin is warm and dry.   Psychiatric: She has a normal mood and affect.     Personal Diagnostic Review      Assessment:       1. MARK (obstructive sleep apnea)    2. Hypersomnia        Outpatient Encounter Prescriptions as of 7/18/2018   Medication Sig Dispense Refill    amLODIPine (NORVASC) 2.5 MG tablet Take 2.5 mg by mouth once daily.      aspirin (ECOTRIN) 81 MG EC tablet Take 81 mg by mouth once daily.      atenolol-chlorthalidone (TENORETIC) 50-25 mg Tab Take 1 tablet by mouth every morning.       cyclobenzaprine (FLEXERIL) 10 MG tablet Take 10 mg by mouth 3 (three) times daily as needed for Muscle spasms.      ibuprofen (ADVIL,MOTRIN) 600 MG tablet Take 600 mg by mouth.      predniSONE (DELTASONE) 20 MG tablet 3 tab for 3 days. 2 tab for 3 days. 1 tab for 3 days. 1/2 tab for 3 days 21 tablet 0    valsartan (DIOVAN) 320 MG tablet        No facility-administered encounter medications on file as of 7/18/2018.      Orders Placed This Encounter   Procedures    Home Sleep Studies     Standing Status:   Future     Standing Expiration Date:   7/18/2019     Scheduling Instructions:      3 night protocol     Plan:   Important that patient starts back on CPAP therapy.  Untreated sleep apnea could be detrimental to her health.  Repeat home sleep test and order CPAP  after results are available.

## 2018-07-25 ENCOUNTER — PATIENT MESSAGE (OUTPATIENT)
Dept: SLEEP MEDICINE | Facility: CLINIC | Age: 53
End: 2018-07-25

## 2018-07-26 ENCOUNTER — TELEPHONE (OUTPATIENT)
Dept: PULMONOLOGY | Facility: CLINIC | Age: 53
End: 2018-07-26

## 2018-07-26 ENCOUNTER — PATIENT MESSAGE (OUTPATIENT)
Dept: PULMONOLOGY | Facility: CLINIC | Age: 53
End: 2018-07-26

## 2018-08-07 ENCOUNTER — TELEPHONE (OUTPATIENT)
Dept: PULMONOLOGY | Facility: CLINIC | Age: 53
End: 2018-08-07

## 2018-08-07 NOTE — TELEPHONE ENCOUNTER
----- Message from Lena Rondon sent at 8/7/2018 12:59 PM CDT -----  Contact: [employer]   [employer] is calling regarding Checking on Orders  for CPAP from DME Provider ... DME Provider  Phone#[phone] Jagdish THOMAS     Attempted to contact Ric with Sleep lab to inquire about HSAT, no answer, LVM will continue to follow up    08/08/18 spoke with Ric in sleep lab, she stated she would call patient today and schedule HSAT

## 2018-08-13 ENCOUNTER — PROCEDURE VISIT (OUTPATIENT)
Dept: SLEEP MEDICINE | Facility: CLINIC | Age: 53
End: 2018-08-13
Payer: COMMERCIAL

## 2018-08-13 ENCOUNTER — TELEPHONE (OUTPATIENT)
Dept: PULMONOLOGY | Facility: CLINIC | Age: 53
End: 2018-08-13

## 2018-08-13 DIAGNOSIS — G47.33 OSA (OBSTRUCTIVE SLEEP APNEA): ICD-10-CM

## 2018-08-13 PROCEDURE — 95806 SLEEP STUDY UNATT&RESP EFFT: CPT | Mod: S$GLB,,, | Performed by: INTERNAL MEDICINE

## 2018-08-13 PROCEDURE — 99499 UNLISTED E&M SERVICE: CPT | Mod: S$GLB,,, | Performed by: INTERNAL MEDICINE

## 2018-08-13 NOTE — PROCEDURES
Home Sleep Studies  Date/Time: 8/13/2018 5:42 PM  Performed by: William Landry MD  Authorized by: Elizabeth Lejeune, NP       Assessment and Recommendations  Three night protocol was used. Data was adequate on of the studies. Data on the 2nd night was used for this  report. Duration was 6 hr 41 min. Lowest oxygen saturation was less than 70%. Cumulatively saturation was  below 90% saturation for 13% of the study duration. Average heart rate was 55 beats per minute.  Snoring was recorded by 50 decibels 100% of the time.  Apnea-hypopnea index: AHI: 89.8 events per hour total events 601.  Severe obstructive sleep apnea-hypopnea syndrome.  Treatment recommendations  CPAP would be the guideline recommendation for first-line treatment for obstructive sleep apnea.  Either order in lab BIPAP/CPAP titration or AutoPAP device 12-20 cm.  Consider retitration to explore BIPAP if continued adherence difficulty  Follow-up evaluation and treatment in the sleep disorder clinic.  Other modalities for treatment of obstructive sleep apnea may be explored in patients with intolerant to CPAP including evaluation by ear nose  and throat, mandibular advancement device, nonsupine sleep positioning device or Hypoglossal nerve pacemaker ( [employer]SPIRE) . Significant  weight loss is recommended to normal ranges. Close follow-up to ensure resolution of symptoms.  Specific mortality risk is attributed to untreated severe obstructive sleep apnea hypopnea syndrome.

## 2018-08-13 NOTE — Clinical Note
Assessment and RecommendationsThree night protocol was used. Data was adequate on of the studies. Data on the 2nd night was used for thisreport. Duration was 6 hr 41 min. Lowest oxygen saturation was less than 70%. Cumulatively saturation wasbelow 90% saturation for 13% of the study duration. Average heart rate was 55 beats per minute.Snoring was recorded by 50 decibels 100% of the time.Apnea-hypopnea index: AHI: 89.8 events per hour total events 601.Severe obstructive sleep apnea-hypopnea syndrome.Treatment recommendationsCPAP would be the guideline recommendation for first-line treatment for obstructive sleep apnea.Either order in lab BIPAP/CPAP titration or AutoPAP device 12-20 cm.Consider retitration to explore BIPAP if continued adherence difficultyFollow-up evaluation and treatment in the sleep disorder clinic.Other modalities for treatment of obstructive sleep apnea may be explored in patients with intolerant to CPAP including evaluation by ear noseand throat, mandibular advan

## 2018-08-13 NOTE — PROGRESS NOTES
Assessment and Recommendations  Three night protocol was used. Data was adequate on of the studies. Data on the 2nd night was used for this  report. Duration was 6 hr 41 min. Lowest oxygen saturation was less than 70%. Cumulatively saturation was  below 90% saturation for 13% of the study duration. Average heart rate was 55 beats per minute.  Snoring was recorded by 50 decibels 100% of the time.  Apnea-hypopnea index: AHI: 89.8 events per hour total events 601.  Severe obstructive sleep apnea-hypopnea syndrome.  Treatment recommendations  CPAP would be the guideline recommendation for first-line treatment for obstructive sleep apnea.  Either order in lab BIPAP/CPAP titration or AutoPAP device 12-20 cm.  Consider retitration to explore BIPAP if continued adherence difficulty  Follow-up evaluation and treatment in the sleep disorder clinic.  Other modalities for treatment of obstructive sleep apnea may be explored in patients with intolerant to CPAP including evaluation by ear nose  and throat, mandibular advancement device, nonsupine sleep positioning device or Hypoglossal nerve pacemaker ( [employer]IRE) . Significant  weight loss is recommended to normal ranges. Close follow-up to ensure resolution of symptoms.  Specific mortality risk is attributed to untreated severe obstructive sleep apnea hypopnea syndrome.

## 2018-08-13 NOTE — TELEPHONE ENCOUNTER
Returned patient call and explained to patient  sleep test had not been read and contact her once results received.

## 2018-08-14 ENCOUNTER — TELEPHONE (OUTPATIENT)
Dept: PULMONOLOGY | Facility: CLINIC | Age: 53
End: 2018-08-14

## 2018-08-14 DIAGNOSIS — G47.33 OSA (OBSTRUCTIVE SLEEP APNEA): Primary | ICD-10-CM

## 2018-08-14 NOTE — TELEPHONE ENCOUNTER
Review of sleep study. Severe sleep apnea on HST.   Reorder CPAP 15cm H2O and encouraged compliance.  Follow up in 6 weeks to review download

## 2018-08-14 NOTE — TELEPHONE ENCOUNTER
Contacted patient and notified her of sleep study results, cpap ordered, given  [facility] D[facility] number to Follow up,  And F/u scheduled. Patient stated understanding

## 2018-09-17 ENCOUNTER — OFFICE VISIT (OUTPATIENT)
Dept: CARDIOLOGY | Facility: CLINIC | Age: 53
End: 2018-09-17
Payer: COMMERCIAL

## 2018-09-17 VITALS
HEIGHT: 60 IN | SYSTOLIC BLOOD PRESSURE: 126 MMHG | HEART RATE: 60 BPM | BODY MASS INDEX: 52.76 KG/M2 | DIASTOLIC BLOOD PRESSURE: 72 MMHG | WEIGHT: 268.75 LBS

## 2018-09-17 DIAGNOSIS — I10 ESSENTIAL HYPERTENSION: Primary | ICD-10-CM

## 2018-09-17 DIAGNOSIS — G47.33 OSA ON CPAP: ICD-10-CM

## 2018-09-17 DIAGNOSIS — E66.01 CLASS 3 SEVERE OBESITY DUE TO EXCESS CALORIES WITHOUT SERIOUS COMORBIDITY WITH BODY MASS INDEX (BMI) OF 50.0 TO 59.9 IN ADULT: ICD-10-CM

## 2018-09-17 DIAGNOSIS — I73.9 PVD (PERIPHERAL VASCULAR DISEASE): ICD-10-CM

## 2018-09-17 PROBLEM — E66.813 CLASS 3 SEVERE OBESITY DUE TO EXCESS CALORIES WITH BODY MASS INDEX (BMI) OF 50.0 TO 59.9 IN ADULT: Status: ACTIVE | Noted: 2018-09-17

## 2018-09-17 PROCEDURE — 99214 OFFICE O/P EST MOD 30 MIN: CPT | Mod: S$GLB,,, | Performed by: INTERNAL MEDICINE

## 2018-09-17 PROCEDURE — 99999 PR PBB SHADOW E&M-EST. PATIENT-LVL III: CPT | Mod: PBBFAC,,, | Performed by: INTERNAL MEDICINE

## 2018-09-17 PROCEDURE — 3078F DIAST BP <80 MM HG: CPT | Mod: CPTII,S$GLB,, | Performed by: INTERNAL MEDICINE

## 2018-09-17 PROCEDURE — 3008F BODY MASS INDEX DOCD: CPT | Mod: CPTII,S$GLB,, | Performed by: INTERNAL MEDICINE

## 2018-09-17 PROCEDURE — 3074F SYST BP LT 130 MM HG: CPT | Mod: CPTII,S$GLB,, | Performed by: INTERNAL MEDICINE

## 2018-09-17 RX ORDER — FUROSEMIDE 20 MG/1
20 TABLET ORAL DAILY PRN
COMMUNITY

## 2018-09-17 NOTE — PROGRESS NOTES
Subjective:   Patient ID:  Ingrid Johnson is a 53 y.o. female who presents for follow up of Follow-up      54 yo female, 6-m f/u. follwoed with Dr. Choudhury  [facility] HTN, MARK on CPAP and obesity  No smoking/dsrinking  No chest pain, dyspnea, palpitation, faint and orthopnea.  Occasional dizziness.  Leg swelling worse in PM  Med compliance.  Echo in  NSR and LVH  mpi no ischemia            Past Medical History:   Diagnosis Date    Abnormal EKG 2018    Dizziness 2018    Hypertension     Low back pain     Obesity     Sleep apnea     questionable--snores    Snoring 2018       Past Surgical History:   Procedure Laterality Date     SECTION, LOW TRANSVERSE      x 4; 1 possible classical c section    COLONOSCOPY N/A 3/8/2017    Procedure: COLONOSCOPY;  Surgeon: Paul Hamlin III, MD;  Location: [facility] ENDO;  Service: Endoscopy;  Laterality: N/A;    COLONOSCOPY N/A 3/8/2017    Performed by Paul Hamlin III, MD at [facility] ENDO    HYSTERECTOMY      Left breast lumpectomy (benign, 15yrs old)      LYSIS, ADHESIONS N/A 2013    [facility]formed by [facility] at [facility] OR    OOPHORECTOMY      LSO    REPAIR, BLADDER N/A 2013    [facility]formed by [facility] at [facility] OR    ROBOTIC HYSTERECTOMY N/A 2013    [facility] by [facility] OR    SALPINGECTOMY, LAPAROSCOPIC Left 2013    Performed by Kim[facility] Garcia [facility] at [facility] OR       Social History     Tobacco Use    Smoking status: Never Smoker    Smokeless tobacco: Never Used   Substance Use Topics    Alcohol use: No    Drug use: No       Family History   Problem Relation Age of Onset    Breast cancer Maternal Aunt     Cancer Paternal Uncle         COLON    Arthritis Mother     Hypertension Mother     Kidney disease Father     Heart disease Father     Hypertension Father     Colon cancer Neg Hx          Review of Systems   Constitution: Negative for decreased appetite, diaphoresis, fever, weakness,  malaise/fatigue and night sweats.   HENT: Negative for nosebleeds.    Eyes: Negative for blurred vision and double vision.   Cardiovascular: Negative for chest pain, claudication, dyspnea on exertion, irregular heartbeat, leg swelling, near-syncope, orthopnea, palpitations, paroxysmal nocturnal dyspnea and syncope.   Respiratory: Negative for cough, shortness of breath, sleep disturbances due to breathing, snoring, sputum production and wheezing.    Endocrine: Negative for cold intolerance and polyuria.   Hematologic/Lymphatic: Does not bruise/bleed easily.   Skin: Negative for rash.   Musculoskeletal: Positive for joint pain. Negative for back pain, falls, joint swelling and neck pain.   Gastrointestinal: Negative for abdominal pain, heartburn, nausea and vomiting.   Genitourinary: Negative for dysuria, frequency and hematuria.   Neurological: Negative for difficulty with concentration, dizziness, focal weakness, headaches, light-headedness, numbness and seizures.   Psychiatric/Behavioral: Negative for depression, memory loss and substance abuse. The patient does not have insomnia.    Allergic/Immunologic: Negative for HIV exposure and hives.       Objective:   Physical Exam   Constitutional: She is oriented to person, place, and time. She appears well-nourished.   HENT:   Head: Normocephalic.   Eyes: Pupils are equal, round, and reactive to light.   Neck: Normal carotid pulses and no JVD present. Carotid bruit is not present. No thyromegaly present.   Cardiovascular: Normal rate, regular rhythm, normal heart sounds and normal pulses.  No extrasystoles are present. PMI is not displaced. Exam reveals no gallop and no S3.   No murmur heard.  Pulmonary/Chest: Breath sounds normal. No stridor. No respiratory distress.   Abdominal: Soft. Bowel sounds are normal. There is no tenderness. There is no rebound.   Musculoskeletal: Normal range of motion. She exhibits edema.   Trace edema    Neurological: She is alert and  oriented to person, place, and time.   Skin: Skin is intact. No rash noted.   Psychiatric: Her behavior is normal.       No results found for: CHOL  No results found for: HDL  No results found for: LDLCALC  No results found for: TRIG  No results found for: CHOLHDL    Chemistry        Component Value Date/Time     06/26/2013 1044    K 4.0 06/26/2013 1044     06/26/2013 1044    CO2 24 06/26/2013 1044    BUN 17 06/26/2013 1044    CREATININE 1.0 06/26/2013 1044    GLU 88 06/26/2013 1044        Component Value Date/Time    CALCIUM 9.6 06/26/2013 1044    ESTGFRAFRICA >60 06/26/2013 1044    EGFRNONAA 59 (L) 06/26/2013 1044          No results found for: LABA1C, HGBA1C  Lab Results   Component Value Date    TSH 1.442 03/21/2013     No results found for: INR, PROTIME  Lab Results   Component Value Date    WBC 9.06 06/26/2013    HGB 10.0 (L) 06/26/2013    HCT 34.0 (L) 06/26/2013    MCV 69.7 (L) 06/26/2013     (H) 06/26/2013     BMP  Sodium   Date Value Ref Range Status   06/26/2013 137 136 - 145 mmol/L Final     Potassium   Date Value Ref Range Status   06/26/2013 4.0 3.5 - 5.1 mmol/L Final     Chloride   Date Value Ref Range Status   06/26/2013 101 95 - 110 mmol/L Final     CO2   Date Value Ref Range Status   06/26/2013 24 23 - 29 mmol/L Final     BUN, Bld   Date Value Ref Range Status   06/26/2013 17 6 - 20 mg/dl Final     Creatinine   Date Value Ref Range Status   06/26/2013 1.0 0.5 - 1.4 mg/dl Final     Calcium   Date Value Ref Range Status   06/26/2013 9.6 8.7 - 10.5 mg/dl Final     Anion Gap   Date Value Ref Range Status   06/26/2013 12.0 8 - 16 mmol/L Final     eGFR if    Date Value Ref Range Status   06/26/2013 >60 >60 mL/min Final     Comment:     Estimated glomerular filtration rate (eGFR) is normalized to an  average body surface area of 1.73 square meters.  The calculation  used to obtain the eGFR is the adjusted MDRD equation, which factors  patient sex, age, race, and  creatinine result.  Since race is unknown  in our information system, the eGFR values for -American  and Non--American patients are given for each creatinine  result.     eGFR if non    Date Value Ref Range Status   06/26/2013 59 (L) >60 mL/min Final     BNP  @LABRCNTIP(BNP,BNPTRIAGEBLO)@  @LABRCNTIP(troponini)@  CrCl cannot be calculated (Patient's most recent lab result is older than the maximum 7 days allowed.).  No results found in the last 24 hours.  No results found in the last 24 hours.  No results found in the last 24 hours.    Assessment:      1. Essential hypertension    2. PVD (peripheral vascular disease)    3. MARK on CPAP    4. Class 3 severe obesity due to excess calories without serious comorbidity with body mass index (BMI) of 50.0 to 59.9 in adult      No CP and CHF  Leg and feet swelling due to PVD and gout  BP wnl   at [facility]    Plan:   Continue current meds.  Recommend heart-healthy diet, weight control and regular exercise.  Eduarda. Risk modification.   RTC in 1 yr with Dr. Choudhury    I have reviewed all pertinent labs and cardiac studies. Plans and recommendations have been formulated under my direct supervision. All questions answered and patient voiced understanding. Patient to continue current medications.

## 2018-10-09 ENCOUNTER — OFFICE VISIT (OUTPATIENT)
Dept: SLEEP MEDICINE | Facility: CLINIC | Age: 53
End: 2018-10-09
Payer: COMMERCIAL

## 2018-10-09 VITALS
OXYGEN SATURATION: 95 % | HEIGHT: 60 IN | BODY MASS INDEX: 51.98 KG/M2 | SYSTOLIC BLOOD PRESSURE: 134 MMHG | WEIGHT: 264.75 LBS | DIASTOLIC BLOOD PRESSURE: 82 MMHG | RESPIRATION RATE: 19 BRPM | HEART RATE: 100 BPM

## 2018-10-09 DIAGNOSIS — E66.01 MORBID OBESITY WITH BMI OF 50.0-59.9, ADULT: ICD-10-CM

## 2018-10-09 DIAGNOSIS — G47.33 OSA (OBSTRUCTIVE SLEEP APNEA): Primary | ICD-10-CM

## 2018-10-09 PROCEDURE — 3079F DIAST BP 80-89 MM HG: CPT | Mod: CPTII,S$GLB,, | Performed by: NURSE PRACTITIONER

## 2018-10-09 PROCEDURE — 99999 PR PBB SHADOW E&M-EST. PATIENT-LVL III: CPT | Mod: PBBFAC,,, | Performed by: NURSE PRACTITIONER

## 2018-10-09 PROCEDURE — 3008F BODY MASS INDEX DOCD: CPT | Mod: CPTII,S$GLB,, | Performed by: NURSE PRACTITIONER

## 2018-10-09 PROCEDURE — 99213 OFFICE O/P EST LOW 20 MIN: CPT | Mod: S$GLB,,, | Performed by: NURSE PRACTITIONER

## 2018-10-09 PROCEDURE — 3075F SYST BP GE 130 - 139MM HG: CPT | Mod: CPTII,S$GLB,, | Performed by: NURSE PRACTITIONER

## 2018-10-09 RX ORDER — LEVOCETIRIZINE DIHYDROCHLORIDE 5 MG/1
5 TABLET, FILM COATED ORAL NIGHTLY
Qty: 30 TABLET | Refills: 11 | Status: SHIPPED | OUTPATIENT
Start: 2018-10-09 | End: 2024-03-12

## 2018-10-09 RX ORDER — MOMETASONE FUROATE 50 UG/1
2 SPRAY, METERED NASAL DAILY
Qty: 1 G | Refills: 11 | Status: SHIPPED | OUTPATIENT
Start: 2018-10-09 | End: 2024-03-12

## 2018-10-09 NOTE — PROGRESS NOTES
Subjective:      Patient ID: Ingrid Johnson is a 53 y.o. female.    Chief Complaint: Sleep Apnea    Patient with sleep apnea presents to the office today for evaluation.  She recently started back on PAP therapy with new machine.  She states she is significantly improved as far hours of sleep quality and energy during the day. She is benefiting from use.   A few night she could not wear due to sinus congestion.     Patient Active Problem List:     Hypertension     Low back pain     Obesity     Status post laparoscopic hysterectomy     Colon cancer screening     Snoring     Abnormal EKG     Dizziness     MARK (obstructive sleep apnea)     Sleep-related bruxism     Inadequate sleep hygiene     PVD (peripheral vascular disease)     MARK on CPAP     Class 3 severe obesity due to excess calories with body mass index (BMI) of 50.0 to 59.9 in adult            /82   Pulse 100   Resp 19   Ht 5' (1.524 m)   Wt 120.1 kg (264 lb 12.4 oz)   LMP 06/16/2013   SpO2 95%   BMI 51.71 kg/m²   Body mass index is 51.71 kg/m².    Review of Systems   Constitutional: Negative.    HENT: Negative.    Respiratory: Negative.    Cardiovascular: Negative.    Musculoskeletal:        Pain to feet due to gout   Gastrointestinal: Negative.    Neurological: Negative.    Psychiatric/Behavioral: Negative.      Objective:      Physical Exam   Constitutional: She is oriented to person, place, and time. She appears well-developed and well-nourished.   obese   HENT:   Head: Normocephalic and atraumatic.   Neck: Normal range of motion. Neck supple.   Cardiovascular: Normal rate and regular rhythm.   Pulmonary/Chest: Effort normal and breath sounds normal.   Abdominal: Soft.   Musculoskeletal: Normal range of motion.   Neurological: She is alert and oriented to person, place, and time.   Skin: Skin is warm and dry.   Psychiatric: She has a normal mood and affect.     Personal Diagnostic Review      Compliance Summary  8/27/2018 -  9/25/2018 (30 days)  Days with Device Usage 24 days  Days without Device Usage 6 days  Percent Days with Device Usage 80.0%  Cumulative Usage 5 days 15 hrs. 6 mins. 27 secs.  Maximum Usage (1 Day) 7 hrs. 50 mins. 40 secs.  Average Usage (All Days) 4 hrs. 30 mins. 12 secs.  Average Usage (Days Used) 5 hrs. 37 mins. 46 secs.  Minimum Usage (1 Day) 2 hrs. 52 mins. 9 secs.  Percent of Days with Usage >= 4 Hours 70.0%  Percent of Days with Usage < 4 Hours 30.0%  Date Range  Total Blower Time 5 days 15 hrs. 7 mins. 39 secs.  CPAP Summary  Average Time in Large Leak Per Day 0 secs.  Average AHI 11.8  CPAP 15.0 cmH2O      Assessment:       1. MARK (obstructive sleep apnea)    2. Morbid obesity with BMI of 50.0-59.9, adult        Outpatient Encounter Medications as of 10/9/2018   Medication Sig Dispense Refill    amLODIPine (NORVASC) 2.5 MG tablet Take 2.5 mg by mouth once daily.      aspirin (ECOTRIN) 81 MG EC tablet Take 81 mg by mouth once daily.      atenolol-chlorthalidone (TENORETIC) 50-25 mg Tab Take 1 tablet by mouth every morning.       cyclobenzaprine (FLEXERIL) 10 MG tablet Take 10 mg by mouth 3 (three) times daily as needed for Muscle spasms.      furosemide (LASIX) 20 MG tablet Take 20 mg by mouth 2 (two) times daily.      ibuprofen (ADVIL,MOTRIN) 600 MG tablet Take 600 mg by mouth.      levocetirizine (XYZAL) 5 MG tablet Take 1 tablet (5 mg total) by mouth every evening. 30 tablet 11    mometasone (NASONEX) 50 mcg/actuation nasal spray 2 sprays by Nasal route once daily. 1 g 11    predniSONE (DELTASONE) 20 MG tablet 3 tab for 3 days. 2 tab for 3 days. 1 tab for 3 days. 1/2 tab for 3 days 21 tablet 0    valsartan (DIOVAN) 320 MG tablet        No facility-administered encounter medications on file as of 10/9/2018.      No orders of the defined types were placed in this encounter.    Plan:      Doing well on PAP settings. Patient is compliant. Follow up in 6 months with PAP data download or call earlier  if any problems.  8lb weight loss in 3 months. Continue weight loss efforts.

## 2019-08-08 DIAGNOSIS — I10 ESSENTIAL HYPERTENSION: Primary | ICD-10-CM

## 2019-08-09 ENCOUNTER — CLINICAL SUPPORT (OUTPATIENT)
Dept: CARDIOLOGY | Facility: CLINIC | Age: 54
End: 2019-08-09
Payer: COMMERCIAL

## 2019-08-09 ENCOUNTER — OFFICE VISIT (OUTPATIENT)
Dept: CARDIOLOGY | Facility: CLINIC | Age: 54
End: 2019-08-09
Payer: COMMERCIAL

## 2019-08-09 VITALS
DIASTOLIC BLOOD PRESSURE: 86 MMHG | HEIGHT: 60 IN | BODY MASS INDEX: 55.01 KG/M2 | WEIGHT: 280.19 LBS | HEART RATE: 58 BPM | SYSTOLIC BLOOD PRESSURE: 130 MMHG

## 2019-08-09 DIAGNOSIS — I10 ESSENTIAL HYPERTENSION: Primary | ICD-10-CM

## 2019-08-09 DIAGNOSIS — E66.01 CLASS 3 SEVERE OBESITY WITH BODY MASS INDEX (BMI) OF 50.0 TO 59.9 IN ADULT, UNSPECIFIED OBESITY TYPE, UNSPECIFIED WHETHER SERIOUS COMORBIDITY PRESENT: ICD-10-CM

## 2019-08-09 DIAGNOSIS — G47.33 OSA ON CPAP: ICD-10-CM

## 2019-08-09 DIAGNOSIS — E66.01 CLASS 3 SEVERE OBESITY DUE TO EXCESS CALORIES WITHOUT SERIOUS COMORBIDITY WITH BODY MASS INDEX (BMI) OF 50.0 TO 59.9 IN ADULT: ICD-10-CM

## 2019-08-09 DIAGNOSIS — I10 ESSENTIAL HYPERTENSION: ICD-10-CM

## 2019-08-09 DIAGNOSIS — I73.9 PVD (PERIPHERAL VASCULAR DISEASE): ICD-10-CM

## 2019-08-09 PROCEDURE — 99214 OFFICE O/P EST MOD 30 MIN: CPT | Mod: S$GLB,,, | Performed by: INTERNAL MEDICINE

## 2019-08-09 PROCEDURE — 3008F BODY MASS INDEX DOCD: CPT | Mod: CPTII,S$GLB,, | Performed by: INTERNAL MEDICINE

## 2019-08-09 PROCEDURE — 99999 PR PBB SHADOW E&M-EST. PATIENT-LVL III: CPT | Mod: PBBFAC,,, | Performed by: INTERNAL MEDICINE

## 2019-08-09 PROCEDURE — 93005 ELECTROCARDIOGRAM TRACING: CPT | Mod: S$GLB,,, | Performed by: INTERNAL MEDICINE

## 2019-08-09 PROCEDURE — 99999 PR PBB SHADOW E&M-EST. PATIENT-LVL III: ICD-10-PCS | Mod: PBBFAC,,, | Performed by: INTERNAL MEDICINE

## 2019-08-09 PROCEDURE — 3075F SYST BP GE 130 - 139MM HG: CPT | Mod: CPTII,S$GLB,, | Performed by: INTERNAL MEDICINE

## 2019-08-09 PROCEDURE — 3079F DIAST BP 80-89 MM HG: CPT | Mod: CPTII,S$GLB,, | Performed by: INTERNAL MEDICINE

## 2019-08-09 PROCEDURE — 3008F PR BODY MASS INDEX (BMI) DOCUMENTED: ICD-10-PCS | Mod: CPTII,S$GLB,, | Performed by: INTERNAL MEDICINE

## 2019-08-09 PROCEDURE — 3075F PR MOST RECENT SYSTOLIC BLOOD PRESS GE 130-139MM HG: ICD-10-PCS | Mod: CPTII,S$GLB,, | Performed by: INTERNAL MEDICINE

## 2019-08-09 PROCEDURE — 99214 PR OFFICE/OUTPT VISIT, EST, LEVL IV, 30-39 MIN: ICD-10-PCS | Mod: S$GLB,,, | Performed by: INTERNAL MEDICINE

## 2019-08-09 PROCEDURE — 93010 ELECTROCARDIOGRAM REPORT: CPT | Mod: S$GLB,,, | Performed by: NUCLEAR MEDICINE

## 2019-08-09 PROCEDURE — 93005 EKG 12-LEAD: ICD-10-PCS | Mod: S$GLB,,, | Performed by: INTERNAL MEDICINE

## 2019-08-09 PROCEDURE — 93010 EKG 12-LEAD: ICD-10-PCS | Mod: S$GLB,,, | Performed by: NUCLEAR MEDICINE

## 2019-08-09 PROCEDURE — 3079F PR MOST RECENT DIASTOLIC BLOOD PRESSURE 80-89 MM HG: ICD-10-PCS | Mod: CPTII,S$GLB,, | Performed by: INTERNAL MEDICINE

## 2019-08-09 RX ORDER — AMLODIPINE BESYLATE 10 MG/1
10 TABLET ORAL DAILY
Qty: 30 TABLET | Refills: 5 | Status: SHIPPED | OUTPATIENT
Start: 2019-08-09 | End: 2021-03-26 | Stop reason: SDUPTHER

## 2019-08-09 RX ORDER — CHLORTHALIDONE 25 MG/1
25 TABLET ORAL DAILY
Qty: 30 TABLET | Refills: 11 | Status: SHIPPED | OUTPATIENT
Start: 2019-08-09 | End: 2020-08-19

## 2019-08-09 RX ORDER — ATENOLOL 50 MG/1
50 TABLET ORAL DAILY
Qty: 30 TABLET | Refills: 11
Start: 2019-08-09 | End: 2021-03-26 | Stop reason: SDUPTHER

## 2019-08-09 NOTE — PROGRESS NOTES
Subjective:   Patient ID:  Ingrid Johnson is a 54 y.o. 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  PMH HTN, MARK on CPAP and obesity  No smoking/dsrinking  C/o palpitation daily for a week. On daytime. Lasted for minutes. Associated with dizziness, slight chest tightness. No faint and dyspnea.   Dry cough for two weeks, and started amlodipine/Benazepril recently.   Leg swelling worse in PM, taking Lasix once a week prn for leg swelling  Med compliance.  Echo in  NSR and LVH  mpi no ischemia          Past Medical History:   Diagnosis Date    Abnormal EKG 2018    Dizziness 2018    Hypertension     Low back pain     Obesity     Sleep apnea     questionable--snores    Snoring 2018       Past Surgical History:   Procedure Laterality Date     SECTION, LOW TRANSVERSE      x 4; 1 possible classical c section    COLONOSCOPY N/A 3/8/2017    [facility] by [facility]lin [facility] at [facility] ENDO    HYSTERECTOMY      Left breast lumpectomy (benign, 15yrs old)      LYSIS, ADHESIONS N/A 2013    [facility] by [facility] at [facility] OR    OOPHORECTOMY      LSO    REPAIR, BLADDER N/A 2013    [facility] at [facility] OR    ROBOTIC HYSTERECTOMY N/A 2013    [facility] at [facility]    SALPINGECTOMY, LAPAROSCOPIC Left 2013    Per[facility] by [facility] at [facility] OR       Social History     Tobacco Use    Smoking status: Never Smoker    Smokeless tobacco: Never Used   Substance Use Topics    Alcohol use: No    Drug use: No       Family History   Problem Relation Age of Onset    Breast cancer Maternal Aunt     Cancer Paternal Uncle         COLON    Arthritis Mother     Hypertension Mother     Kidney disease Father     Heart disease Father     Hypertension Father     Colon cancer Neg Hx          Review of  Systems   Constitution: Negative for decreased appetite, diaphoresis, fever, malaise/fatigue and night sweats.   HENT: Negative for nosebleeds.    Eyes: Negative for blurred vision and double vision.   Cardiovascular: Positive for palpitations. Negative for chest pain, claudication, dyspnea on exertion, irregular heartbeat, leg swelling, near-syncope, orthopnea, paroxysmal nocturnal dyspnea and syncope.   Respiratory: Positive for cough. Negative for shortness of breath, sleep disturbances due to breathing, snoring, sputum production and wheezing.    Endocrine: Negative for cold intolerance and polyuria.   Hematologic/Lymphatic: Does not bruise/bleed easily.   Skin: Negative for rash.   Musculoskeletal: Positive for joint pain. Negative for back pain, falls, joint swelling and neck pain.   Gastrointestinal: Negative for abdominal pain, heartburn, nausea and vomiting.   Genitourinary: Negative for dysuria, frequency and hematuria.   Neurological: Negative for difficulty with concentration, dizziness, focal weakness, headaches, light-headedness, numbness, seizures and weakness.   Psychiatric/Behavioral: Negative for depression, memory loss and substance abuse. The patient does not have insomnia.    Allergic/Immunologic: Negative for HIV exposure and hives.       Objective:   Physical Exam   Constitutional: She is oriented to person, place, and time. She appears well-nourished.   HENT:   Head: Normocephalic.   Eyes: Pupils are equal, round, and reactive to light.   Neck: Normal carotid pulses and no JVD present. Carotid bruit is not present. No thyromegaly present.   Cardiovascular: Normal rate, regular rhythm, normal heart sounds and normal pulses.  No extrasystoles are present. PMI is not displaced. Exam reveals no gallop and no S3.   No murmur heard.  Pulmonary/Chest: Breath sounds normal. No stridor. No respiratory distress.   Abdominal: Soft. Bowel sounds are normal. There is no tenderness. There is no rebound.    Musculoskeletal: Normal range of motion. She exhibits edema.   Trace edema    Neurological: She is alert and oriented to person, place, and time.   Skin: Skin is intact. No rash noted.   Psychiatric: Her behavior is normal.       No results found for: CHOL  No results found for: HDL  No results found for: LDLCALC  No results found for: TRIG  No results found for: CHOLHDL    Chemistry        Component Value Date/Time     06/26/2013 1044    K 4.0 06/26/2013 1044     06/26/2013 1044    CO2 24 06/26/2013 1044    BUN 17 06/26/2013 1044    CREATININE 1.0 06/26/2013 1044    GLU 88 06/26/2013 1044        Component Value Date/Time    CALCIUM 9.6 06/26/2013 1044    ESTGFRAFRICA >60 06/26/2013 1044    EGFRNONAA 59 (L) 06/26/2013 1044          No results found for: LABA1C, HGBA1C  Lab Results   Component Value Date    TSH 1.442 03/21/2013     No results found for: INR, PROTIME  Lab Results   Component Value Date    WBC 9.06 06/26/2013    HGB 10.0 (L) 06/26/2013    HCT 34.0 (L) 06/26/2013    MCV 69.7 (L) 06/26/2013     (H) 06/26/2013     BMP  Sodium   Date Value Ref Range Status   06/26/2013 137 136 - 145 mmol/L Final     Potassium   Date Value Ref Range Status   06/26/2013 4.0 3.5 - 5.1 mmol/L Final     Chloride   Date Value Ref Range Status   06/26/2013 101 95 - 110 mmol/L Final     CO2   Date Value Ref Range Status   06/26/2013 24 23 - 29 mmol/L Final     BUN, Bld   Date Value Ref Range Status   06/26/2013 17 6 - 20 mg/dl Final     Creatinine   Date Value Ref Range Status   06/26/2013 1.0 0.5 - 1.4 mg/dl Final     Calcium   Date Value Ref Range Status   06/26/2013 9.6 8.7 - 10.5 mg/dl Final     Anion Gap   Date Value Ref Range Status   06/26/2013 12.0 8 - 16 mmol/L Final     eGFR if    Date Value Ref Range Status   06/26/2013 >60 >60 mL/min Final     Comment:     Estimated glomerular filtration rate (eGFR) is normalized to an  average body surface area of 1.73 square meters.  The  calculation  used to obtain the eGFR is the adjusted MDRD equation, which factors  patient sex, age, race, and creatinine result.  Since race is unknown  in our information system, the eGFR values for -American  and Non--American patients are given for each creatinine  result.     eGFR if non    Date Value Ref Range Status   06/26/2013 59 (L) >60 mL/min Final     BNP  @LABRCNTIP(BNP,BNPTRIAGEBLO)@  @LABRCNTIP(troponini)@  CrCl cannot be calculated (Patient's most recent lab result is older than the maximum 7 days allowed.).  No results found in the last 24 hours.  No results found in the last 24 hours.  No results found in the last 24 hours.    Assessment:      1. Essential hypertension    2. PVD (peripheral vascular disease)    3. Class 3 severe obesity due to excess calories without serious comorbidity with body mass index (BMI) of 50.0 to 59.9 in adult    4. MARK on CPAP    5. Class 3 severe obesity with body mass index (BMI) of 50.0 to 59.9 in adult, unspecified obesity type, unspecified whether serious comorbidity present      Cough and palpitation, consider to be allergic to ACEI    Plan:   D/c benazepril  Continue Amlodipine 10 mg and atenolol 50 mg   Add Chlorthalidone 25 mg daily  DASH  Ok lasix 20 mg prn for leg swelling. Only taking once a week  If BP uncontrolled, resume ARB  RTC as needed if the cough and palpitation not improve   F/u with PCP

## 2020-02-21 ENCOUNTER — OFFICE VISIT (OUTPATIENT)
Dept: PULMONOLOGY | Facility: CLINIC | Age: 55
End: 2020-02-21
Payer: COMMERCIAL

## 2020-02-21 VITALS
DIASTOLIC BLOOD PRESSURE: 72 MMHG | RESPIRATION RATE: 17 BRPM | HEIGHT: 60 IN | HEART RATE: 61 BPM | SYSTOLIC BLOOD PRESSURE: 108 MMHG | BODY MASS INDEX: 55.86 KG/M2 | OXYGEN SATURATION: 96 % | WEIGHT: 284.5 LBS

## 2020-02-21 DIAGNOSIS — G47.63 SLEEP-RELATED BRUXISM: ICD-10-CM

## 2020-02-21 DIAGNOSIS — E66.01 CLASS 3 SEVERE OBESITY DUE TO EXCESS CALORIES WITHOUT SERIOUS COMORBIDITY WITH BODY MASS INDEX (BMI) OF 50.0 TO 59.9 IN ADULT: ICD-10-CM

## 2020-02-21 DIAGNOSIS — G47.33 OSA ON CPAP: Primary | ICD-10-CM

## 2020-02-21 PROCEDURE — 99999 PR PBB SHADOW E&M-EST. PATIENT-LVL IV: CPT | Mod: PBBFAC,,, | Performed by: NURSE PRACTITIONER

## 2020-02-21 PROCEDURE — 3078F DIAST BP <80 MM HG: CPT | Mod: CPTII,S$GLB,, | Performed by: NURSE PRACTITIONER

## 2020-02-21 PROCEDURE — 99214 PR OFFICE/OUTPT VISIT, EST, LEVL IV, 30-39 MIN: ICD-10-PCS | Mod: S$GLB,,, | Performed by: NURSE PRACTITIONER

## 2020-02-21 PROCEDURE — 3008F BODY MASS INDEX DOCD: CPT | Mod: CPTII,S$GLB,, | Performed by: NURSE PRACTITIONER

## 2020-02-21 PROCEDURE — 99999 PR PBB SHADOW E&M-EST. PATIENT-LVL IV: ICD-10-PCS | Mod: PBBFAC,,, | Performed by: NURSE PRACTITIONER

## 2020-02-21 PROCEDURE — 99214 OFFICE O/P EST MOD 30 MIN: CPT | Mod: S$GLB,,, | Performed by: NURSE PRACTITIONER

## 2020-02-21 PROCEDURE — 3074F SYST BP LT 130 MM HG: CPT | Mod: CPTII,S$GLB,, | Performed by: NURSE PRACTITIONER

## 2020-02-21 PROCEDURE — 3078F PR MOST RECENT DIASTOLIC BLOOD PRESSURE < 80 MM HG: ICD-10-PCS | Mod: CPTII,S$GLB,, | Performed by: NURSE PRACTITIONER

## 2020-02-21 PROCEDURE — 3008F PR BODY MASS INDEX (BMI) DOCUMENTED: ICD-10-PCS | Mod: CPTII,S$GLB,, | Performed by: NURSE PRACTITIONER

## 2020-02-21 PROCEDURE — 3074F PR MOST RECENT SYSTOLIC BLOOD PRESSURE < 130 MM HG: ICD-10-PCS | Mod: CPTII,S$GLB,, | Performed by: NURSE PRACTITIONER

## 2020-02-21 RX ORDER — ALPRAZOLAM 0.25 MG/1
0.25 TABLET ORAL
COMMUNITY
End: 2024-03-12

## 2020-02-21 NOTE — ASSESSMENT & PLAN NOTE
PSG split night study 2/2/2018 severe obstructive sleep apnea / hypopnea syndrome AHI 96.9. Cpap titration 15 cm.    Home Sleep Study 8/10/2018, 8/11/2018, 8/12/2018  Three night protocol was used.  Severe obstructive sleep apnea-hypopnea syndrome AHI: 89.8 events per hour total events 601.  On CPAP 15 cm   Nasal dream wear, 2/21/2020 patient request Full face mask.   With request for remote changed auto CPAP 7-20 cm   Adherence  HME: [facility]     .

## 2020-02-21 NOTE — PATIENT INSTRUCTIONS
What Are Snoring and Obstructive Sleep Apnea?  If youve ever had a stuffed-up nose, you know the feeling of trying to breathe through a very narrow passageway. This is what happens in your throat when you snore. While you sleep, structures in your throat partially block your air passage, making the passage narrow and hard to breathe through. If the entire passage becomes blocked and you cant breathe at all, you have sleep apnea.      Snoring Obstructive sleep apnea   Snoring  If your throat structures are too large or the muscles relax too much during sleep, the air passage may be partially blocked. As air from the nose or mouth passes around this blockage, the throat structures vibrate, causing the familiar sound of snoring. At times, this sound can be so loud that snorers wake up others, or even themselves, during the night. Snoring gets worse as more and more of the air passage is blocked.  Obstructive sleep apnea  If the structures completely block the throat, air cant flow to the lungs at all. This is called apnea (meaning no breathing). Since the lungs arent getting fresh air, the brain tells the body to wake up just enough to tighten the muscles and unblock the air passage. With a loud gasp, breathing begins again. This process may be repeated over and over again throughout the night, making your sleep fragmented with a lighter stage of sleep. Even though you do not remember waking up many times during the night to a lighter sleep, you feel tired the next day. The lack of sleep and fresh air can also strain your lungs, heart, and other organs, leading to problems such as high blood pressure, heart attack, or stroke.  Problems in the nose and jaw  Problems in the structure of the nose may obstruct breathing. A crooked (deviated) septum or swollen turbinates can make snoring worse or lead to apnea. Also, a receding jaw may make the tongue sit too far back, so its more likely to block the airway when  youre asleep.        Date Last Reviewed: 7/18/2015  © 2436-6125 [employer]. [address] [place] [address]. All rights reserved. This information is not intended as a substitute for professional medical care. Always follow your healthcare professional's instructions.        Continuous Positive Air Pressure (CPAP)     A mask over the nose gently directs air into the throat to keep the airway open.   Continuous positive air pressure (CPAP) uses gentle air pressure to hold the airway open. CPAP is often the most effective treatment for sleep apnea and severe snoring. It works very well for many people. But keep in mind that it can take several adjustments before the setup is right for you.  How CPAP works  The CPAP machine  is a small portable pump beside the bed. The pump sends air through a hose, which is held over your nose and mouth by a mask. Mild air pressure is gently pushed through your airway. The air pressure nudges sagging tissues aside. This widens the airway so you can breathe better. CPAP may be combined with other kinds of therapy for sleep apnea.  Types of air pressure treatments  There are different types of CPAP. Your doctor or CPAP technician will help you decide which type is best for you:  · Basic CPAP keeps the pressure constant all night long.  · A bilevel device (BiPAP) provides more pressure when you breathe in and less when you breathe out. A BiPAP machine also may be set to provide automatic breaths to maintain breathing if you stop breathing while sleeping.  · An autoCPAP device automatically adjusts pressure throughout the night and in response to changes such as body position, sleep stage, and snoring.  Date Last Reviewed: 8/10/2015  © 4711-3115 [employer]. [address] [place], [place] [id number]. All rights reserved. This information is not intended as a substitute for professional medical care. Always follow your healthcare professional's  instructions.

## 2020-03-26 ENCOUNTER — PATIENT MESSAGE (OUTPATIENT)
Dept: PULMONOLOGY | Facility: CLINIC | Age: 55
End: 2020-03-26

## 2020-04-21 DIAGNOSIS — Z01.84 ANTIBODY RESPONSE EXAMINATION: ICD-10-CM

## 2020-05-21 DIAGNOSIS — Z01.84 ANTIBODY RESPONSE EXAMINATION: ICD-10-CM

## 2020-06-20 DIAGNOSIS — Z01.84 ANTIBODY RESPONSE EXAMINATION: ICD-10-CM

## 2020-07-20 DIAGNOSIS — Z01.84 ANTIBODY RESPONSE EXAMINATION: ICD-10-CM

## 2020-08-19 DIAGNOSIS — Z01.84 ANTIBODY RESPONSE EXAMINATION: ICD-10-CM

## 2020-09-18 DIAGNOSIS — Z01.84 ANTIBODY RESPONSE EXAMINATION: ICD-10-CM

## 2020-10-18 DIAGNOSIS — Z01.84 ANTIBODY RESPONSE EXAMINATION: ICD-10-CM

## 2020-11-17 DIAGNOSIS — Z01.84 ANTIBODY RESPONSE EXAMINATION: ICD-10-CM

## 2020-12-30 ENCOUNTER — IMMUNIZATION (OUTPATIENT)
Dept: INTERNAL MEDICINE | Facility: CLINIC | Age: 55
End: 2020-12-30

## 2020-12-30 DIAGNOSIS — Z23 NEED FOR VACCINATION: ICD-10-CM

## 2020-12-30 PROCEDURE — 0001A COVID-19, MRNA, LNP-S, PF, 30 MCG/0.3 ML DOSE VACCINE: ICD-10-PCS | Mod: CV19,S$GLB,, | Performed by: FAMILY MEDICINE

## 2020-12-30 PROCEDURE — 0001A COVID-19, MRNA, LNP-S, PF, 30 MCG/0.3 ML DOSE VACCINE: CPT | Mod: CV19,S$GLB,, | Performed by: FAMILY MEDICINE

## 2020-12-30 PROCEDURE — 91300 COVID-19, MRNA, LNP-S, PF, 30 MCG/0.3 ML DOSE VACCINE: CPT | Mod: S$GLB,,, | Performed by: FAMILY MEDICINE

## 2020-12-30 PROCEDURE — 91300 COVID-19, MRNA, LNP-S, PF, 30 MCG/0.3 ML DOSE VACCINE: ICD-10-PCS | Mod: S$GLB,,, | Performed by: FAMILY MEDICINE

## 2021-01-20 ENCOUNTER — IMMUNIZATION (OUTPATIENT)
Dept: INTERNAL MEDICINE | Facility: CLINIC | Age: 56
End: 2021-01-20
Payer: COMMERCIAL

## 2021-01-20 DIAGNOSIS — Z23 NEED FOR VACCINATION: Primary | ICD-10-CM

## 2021-01-20 PROCEDURE — 91300 COVID-19, MRNA, LNP-S, PF, 30 MCG/0.3 ML DOSE VACCINE: CPT | Mod: PBBFAC | Performed by: FAMILY MEDICINE

## 2021-01-20 PROCEDURE — 0002A COVID-19, MRNA, LNP-S, PF, 30 MCG/0.3 ML DOSE VACCINE: CPT | Mod: PBBFAC | Performed by: FAMILY MEDICINE

## 2021-02-16 NOTE — TELEPHONE ENCOUNTER
Contacted patient and advised still awaiting prior auth for sleep study, patient was given Ric phone number with sleep lab to follow up patient stated understanding  
show

## 2021-03-24 DIAGNOSIS — I10 ESSENTIAL HYPERTENSION: Primary | ICD-10-CM

## 2021-03-26 ENCOUNTER — OFFICE VISIT (OUTPATIENT)
Dept: CARDIOLOGY | Facility: CLINIC | Age: 56
End: 2021-03-26
Payer: COMMERCIAL

## 2021-03-26 ENCOUNTER — HOSPITAL ENCOUNTER (OUTPATIENT)
Dept: CARDIOLOGY | Facility: HOSPITAL | Age: 56
Discharge: HOME | End: 2021-03-26
Attending: INTERNAL MEDICINE
Payer: COMMERCIAL

## 2021-03-26 VITALS
DIASTOLIC BLOOD PRESSURE: 70 MMHG | HEART RATE: 66 BPM | SYSTOLIC BLOOD PRESSURE: 102 MMHG | OXYGEN SATURATION: 97 % | WEIGHT: 285.25 LBS | BODY MASS INDEX: 55.71 KG/M2

## 2021-03-26 DIAGNOSIS — G47.33 OSA ON CPAP: ICD-10-CM

## 2021-03-26 DIAGNOSIS — I73.9 PVD (PERIPHERAL VASCULAR DISEASE): ICD-10-CM

## 2021-03-26 DIAGNOSIS — R00.2 PALPITATION: ICD-10-CM

## 2021-03-26 DIAGNOSIS — R07.89 OTHER CHEST PAIN: ICD-10-CM

## 2021-03-26 DIAGNOSIS — I51.7 LVH (LEFT VENTRICULAR HYPERTROPHY): ICD-10-CM

## 2021-03-26 DIAGNOSIS — E66.01 CLASS 3 SEVERE OBESITY DUE TO EXCESS CALORIES WITHOUT SERIOUS COMORBIDITY WITH BODY MASS INDEX (BMI) OF 50.0 TO 59.9 IN ADULT: ICD-10-CM

## 2021-03-26 DIAGNOSIS — I10 ESSENTIAL HYPERTENSION: ICD-10-CM

## 2021-03-26 DIAGNOSIS — I10 ESSENTIAL HYPERTENSION: Primary | ICD-10-CM

## 2021-03-26 DIAGNOSIS — R94.31 EKG ABNORMALITIES: ICD-10-CM

## 2021-03-26 PROCEDURE — 99215 PR OFFICE/OUTPT VISIT, EST, LEVL V, 40-54 MIN: ICD-10-PCS | Mod: S$GLB,,, | Performed by: INTERNAL MEDICINE

## 2021-03-26 PROCEDURE — 3078F PR MOST RECENT DIASTOLIC BLOOD PRESSURE < 80 MM HG: ICD-10-PCS | Mod: CPTII,S$GLB,, | Performed by: INTERNAL MEDICINE

## 2021-03-26 PROCEDURE — 99999 PR PBB SHADOW E&M-EST. PATIENT-LVL III: CPT | Mod: PBBFAC,,, | Performed by: INTERNAL MEDICINE

## 2021-03-26 PROCEDURE — 93010 ELECTROCARDIOGRAM REPORT: CPT | Mod: ,,, | Performed by: INTERNAL MEDICINE

## 2021-03-26 PROCEDURE — 99999 PR PBB SHADOW E&M-EST. PATIENT-LVL III: ICD-10-PCS | Mod: PBBFAC,,, | Performed by: INTERNAL MEDICINE

## 2021-03-26 PROCEDURE — 3008F BODY MASS INDEX DOCD: CPT | Mod: CPTII,S$GLB,, | Performed by: INTERNAL MEDICINE

## 2021-03-26 PROCEDURE — 1126F AMNT PAIN NOTED NONE PRSNT: CPT | Mod: S$GLB,,, | Performed by: INTERNAL MEDICINE

## 2021-03-26 PROCEDURE — 3074F SYST BP LT 130 MM HG: CPT | Mod: CPTII,S$GLB,, | Performed by: INTERNAL MEDICINE

## 2021-03-26 PROCEDURE — 93010 EKG 12-LEAD: ICD-10-PCS | Mod: ,,, | Performed by: INTERNAL MEDICINE

## 2021-03-26 PROCEDURE — 3074F PR MOST RECENT SYSTOLIC BLOOD PRESSURE < 130 MM HG: ICD-10-PCS | Mod: CPTII,S$GLB,, | Performed by: INTERNAL MEDICINE

## 2021-03-26 PROCEDURE — 1126F PR PAIN SEVERITY QUANTIFIED, NO PAIN PRESENT: ICD-10-PCS | Mod: S$GLB,,, | Performed by: INTERNAL MEDICINE

## 2021-03-26 PROCEDURE — 3078F DIAST BP <80 MM HG: CPT | Mod: CPTII,S$GLB,, | Performed by: INTERNAL MEDICINE

## 2021-03-26 PROCEDURE — 93005 ELECTROCARDIOGRAM TRACING: CPT

## 2021-03-26 PROCEDURE — 99215 OFFICE O/P EST HI 40 MIN: CPT | Mod: S$GLB,,, | Performed by: INTERNAL MEDICINE

## 2021-03-26 PROCEDURE — 3008F PR BODY MASS INDEX (BMI) DOCUMENTED: ICD-10-PCS | Mod: CPTII,S$GLB,, | Performed by: INTERNAL MEDICINE

## 2021-03-26 RX ORDER — AMLODIPINE BESYLATE 2.5 MG/1
2.5 TABLET ORAL DAILY
Qty: 30 TABLET | Refills: 5 | Status: SHIPPED | OUTPATIENT
Start: 2021-03-26 | End: 2024-03-12 | Stop reason: HOSPADM

## 2021-03-26 RX ORDER — ATENOLOL 100 MG/1
100 TABLET ORAL DAILY
Qty: 30 TABLET | Refills: 5 | Status: SHIPPED | OUTPATIENT
Start: 2021-03-26 | End: 2022-01-11

## 2021-04-15 ENCOUNTER — HOSPITAL ENCOUNTER (OUTPATIENT)
Dept: CARDIOLOGY | Facility: HOSPITAL | Age: 56
Discharge: HOME | End: 2021-04-15
Attending: INTERNAL MEDICINE
Payer: COMMERCIAL

## 2021-04-15 VITALS
SYSTOLIC BLOOD PRESSURE: 186 MMHG | HEART RATE: 55 BPM | HEIGHT: 60 IN | BODY MASS INDEX: 55.95 KG/M2 | SYSTOLIC BLOOD PRESSURE: 102 MMHG | WEIGHT: 285 LBS | WEIGHT: 285 LBS | DIASTOLIC BLOOD PRESSURE: 70 MMHG | DIASTOLIC BLOOD PRESSURE: 115 MMHG | HEIGHT: 60 IN | BODY MASS INDEX: 55.95 KG/M2

## 2021-04-15 DIAGNOSIS — R94.31 EKG ABNORMALITIES: ICD-10-CM

## 2021-04-15 DIAGNOSIS — I10 ESSENTIAL HYPERTENSION: ICD-10-CM

## 2021-04-15 DIAGNOSIS — I51.7 LVH (LEFT VENTRICULAR HYPERTROPHY): ICD-10-CM

## 2021-04-15 LAB
ASCENDING AORTA: 2.77 CM
AV INDEX (PROSTH): 0.92
AV MEAN GRADIENT: 4 MMHG
AV PEAK GRADIENT: 7 MMHG
AV VALVE AREA: 3.42 CM2
AV VELOCITY RATIO: 0.86
BSA FOR ECHO PROCEDURE: 2.34 M2
CV ECHO LV RWT: 0.54 CM
CV STRESS BASE HR: 61 BPM
DIASTOLIC BLOOD PRESSURE: 115 MMHG
DOP CALC AO PEAK VEL: 1.33 M/S
DOP CALC AO VTI: 31.23 CM
DOP CALC LVOT AREA: 3.7 CM2
DOP CALC LVOT DIAMETER: 2.18 CM
DOP CALC LVOT PEAK VEL: 1.15 M/S
DOP CALC LVOT STROKE VOLUME: 106.77 CM3
DOP CALC RVOT PEAK VEL: 0.84 M/S
DOP CALC RVOT VTI: 23.85 CM
DOP CALCLVOT PEAK VEL VTI: 28.62 CM
E WAVE DECELERATION TIME: 205.99 MSEC
E/A RATIO: 1.26
E/E' RATIO: 8.8 M/S
ECHO LV POSTERIOR WALL: 1.12 CM (ref 0.6–1.1)
EJECTION FRACTION: 60 %
FRACTIONAL SHORTENING: 41 % (ref 28–44)
INTERVENTRICULAR SEPTUM: 1.05 CM (ref 0.6–1.1)
IVRT: 97.05 MSEC
LA MAJOR: 4.05 CM
LA MINOR: 4.2 CM
LA WIDTH: 3.5 CM
LEFT ATRIUM SIZE: 3.49 CM
LEFT ATRIUM VOLUME INDEX: 19.7 ML/M2
LEFT ATRIUM VOLUME: 42.81 CM3
LEFT INTERNAL DIMENSION IN SYSTOLE: 2.45 CM (ref 2.1–4)
LEFT VENTRICLE DIASTOLIC VOLUME INDEX: 34.8 ML/M2
LEFT VENTRICLE DIASTOLIC VOLUME: 75.51 ML
LEFT VENTRICLE MASS INDEX: 69 G/M2
LEFT VENTRICLE SYSTOLIC VOLUME INDEX: 9.8 ML/M2
LEFT VENTRICLE SYSTOLIC VOLUME: 21.18 ML
LEFT VENTRICULAR INTERNAL DIMENSION IN DIASTOLE: 4.13 CM (ref 3.5–6)
LEFT VENTRICULAR MASS: 150.03 G
LV LATERAL E/E' RATIO: 8.8 M/S
LV SEPTAL E/E' RATIO: 8.8 M/S
MV A" WAVE DURATION": 11.42 MSEC
MV PEAK A VEL: 0.7 M/S
MV PEAK E VEL: 0.88 M/S
MV STENOSIS PRESSURE HALF TIME: 59.74 MS
MV VALVE AREA P 1/2 METHOD: 3.68 CM2
OHS CV CPX 1 MINUTE RECOVERY HEART RATE: 87 BPM
OHS CV CPX 85 PERCENT MAX PREDICTED HEART RATE MALE: 132
OHS CV CPX ESTIMATED METS: 4.6
OHS CV CPX MAX PREDICTED HEART RATE: 156
OHS CV CPX PATIENT IS FEMALE: 1
OHS CV CPX PATIENT IS MALE: 0
OHS CV CPX PEAK DIASTOLIC BLOOD PRESSURE: 115 MMHG
OHS CV CPX PEAK HEAR RATE: 112 BPM
OHS CV CPX PEAK RATE PRESSURE PRODUCT: NORMAL
OHS CV CPX PEAK SYSTOLIC BLOOD PRESSURE: 186 MMHG
OHS CV CPX PERCENT MAX PREDICTED HEART RATE ACHIEVED: 72
OHS CV CPX RATE PRESSURE PRODUCT PRESENTING: NORMAL
PISA TR MAX VEL: 1.17 M/S
PULM VEIN S/D RATIO: 1.19
PV MEAN GRADIENT: 2 MMHG
PV PEAK D VEL: 0.47 M/S
PV PEAK S VEL: 0.56 M/S
PV PEAK VELOCITY: 0.93 CM/S
RA MAJOR: 3.84 CM
RA PRESSURE: 3 MMHG
RA WIDTH: 2.87 CM
RIGHT VENTRICULAR END-DIASTOLIC DIMENSION: 2.83 CM
SINUS: 2.69 CM
STJ: 2.44 CM
STRESS ECHO POST EXERCISE DUR MIN: 2 MINUTES
STRESS ECHO POST EXERCISE DUR SEC: 29 SECONDS
SYSTOLIC BLOOD PRESSURE: 186 MMHG
TDI LATERAL: 0.1 M/S
TDI SEPTAL: 0.1 M/S
TDI: 0.1 M/S
TR MAX PG: 5 MMHG
TV REST PULMONARY ARTERY PRESSURE: 8 MMHG

## 2021-04-15 PROCEDURE — 93016 EXERCISE STRESS - EKG (CUPID ONLY): ICD-10-PCS | Mod: ,,, | Performed by: INTERNAL MEDICINE

## 2021-04-15 PROCEDURE — 93306 TTE W/DOPPLER COMPLETE: CPT | Mod: 26,,, | Performed by: INTERNAL MEDICINE

## 2021-04-15 PROCEDURE — 93306 ECHO (CUPID ONLY): ICD-10-PCS | Mod: 26,,, | Performed by: INTERNAL MEDICINE

## 2021-04-15 PROCEDURE — 93016 CV STRESS TEST SUPVJ ONLY: CPT | Mod: ,,, | Performed by: INTERNAL MEDICINE

## 2021-04-15 PROCEDURE — 93018 EXERCISE STRESS - EKG (CUPID ONLY): ICD-10-PCS | Mod: ,,, | Performed by: INTERNAL MEDICINE

## 2021-04-15 PROCEDURE — 93226 XTRNL ECG REC<48 HR SCAN A/R: CPT

## 2021-04-15 PROCEDURE — 93018 CV STRESS TEST I&R ONLY: CPT | Mod: ,,, | Performed by: INTERNAL MEDICINE

## 2021-04-15 PROCEDURE — 93017 CV STRESS TEST TRACING ONLY: CPT

## 2021-04-15 PROCEDURE — 93227 XTRNL ECG REC<48 HR R&I: CPT | Mod: ,,, | Performed by: INTERNAL MEDICINE

## 2021-04-15 PROCEDURE — 93306 TTE W/DOPPLER COMPLETE: CPT

## 2021-04-15 PROCEDURE — 93227 HOLTER MONITOR - 48 HOUR (CUPID ONLY): ICD-10-PCS | Mod: ,,, | Performed by: INTERNAL MEDICINE

## 2021-04-16 ENCOUNTER — CLINICAL SUPPORT (OUTPATIENT)
Dept: INTERNAL MEDICINE | Facility: CLINIC | Age: 56
End: 2021-04-16
Payer: COMMERCIAL

## 2021-04-16 ENCOUNTER — TELEPHONE (OUTPATIENT)
Dept: CARDIOLOGY | Facility: CLINIC | Age: 56
End: 2021-04-16

## 2021-04-16 DIAGNOSIS — I10 ESSENTIAL HYPERTENSION: ICD-10-CM

## 2021-04-16 DIAGNOSIS — E66.01 CLASS 3 SEVERE OBESITY DUE TO EXCESS CALORIES WITHOUT SERIOUS COMORBIDITY WITH BODY MASS INDEX (BMI) OF 50.0 TO 59.9 IN ADULT: ICD-10-CM

## 2021-04-16 PROCEDURE — 97802 PR MED NUTR THER, 1ST, INDIV, EA 15 MIN: ICD-10-PCS | Mod: 95,,, | Performed by: DIETITIAN, REGISTERED

## 2021-04-16 PROCEDURE — 97802 MEDICAL NUTRITION INDIV IN: CPT | Mod: 95,,, | Performed by: DIETITIAN, REGISTERED

## 2021-04-19 LAB
OHS CV EVENT MONITOR DAY: 0
OHS CV HOLTER LENGTH DECIMAL HOURS: 48
OHS CV HOLTER LENGTH HOURS: 48
OHS CV HOLTER LENGTH MINUTES: 0

## 2021-04-21 ENCOUNTER — TELEPHONE (OUTPATIENT)
Dept: CARDIOLOGY | Facility: CLINIC | Age: 56
End: 2021-04-21

## 2021-10-15 ENCOUNTER — IMMUNIZATION (OUTPATIENT)
Dept: PRIMARY CARE CLINIC | Facility: CLINIC | Age: 56
End: 2021-10-15
Payer: COMMERCIAL

## 2021-10-15 DIAGNOSIS — Z23 NEED FOR VACCINATION: Primary | ICD-10-CM

## 2021-10-15 PROCEDURE — 91300 COVID-19, MRNA, LNP-S, PF, 30 MCG/0.3 ML DOSE VACCINE: CPT | Mod: PBBFAC | Performed by: FAMILY MEDICINE

## 2021-10-15 PROCEDURE — 0003A COVID-19, MRNA, LNP-S, PF, 30 MCG/0.3 ML DOSE VACCINE: CPT | Mod: CV19,PBBFAC | Performed by: FAMILY MEDICINE

## 2022-11-16 NOTE — PROGRESS NOTES
Subjective:      Patient ID: Ingrid Johnson is a 55 y.o. female.    Chief Complaint: Sleep Apnea    HPI: Ingrid Johnson is here for follow up for MARK with yearly CPAP complaince assessment, last seen by Liz 2018.   She is on CPAP of 15 cmH2O pressure which is optimally controlling sleep apnea with apneic index (AHI) 9.5 events an hour.   She is NOT compliant with CPAP use. Complaince download today reveals 30.0% of days with greater than 4 hours of device use.   Patient reports benefit from CPAP use and denies snoring or excessive daytime sleepiness when CPAP is worn, definitely feels better next day when CPAP worn.  She wants to resume CPAP and needed new supplies.   Patient reports complaint of not using cpap machine related to being out of town and caring for sick family members, with death of her father and brother.     Dream wear nasal mask is tolerated.   [place] 16    PSG 2/2/2018   The diagnostic polysomnography revealed a severe obstructive sleep apnea / hypopnea syndrome (A + H Index = 96.9  events / hr asleep with 51.0 respiratory event - arousals / hr asleep for the study, and an additional 1.2 RERAs / hr asleep  (respiratory effort - related arousals). The mean SpO2 value was 92.4 %, moderate, minimum oxygen saturation during sleep  was 86.0 %, and waking baseline SpO2 was 95 %. Persistent, moderately loud to loud snoring was noted.  2. CPAP was initiated at 12:56 am. The titration polysomnography revealed that 15 cm H2O pressure (C - Flex 3, humidity 2).    Home Sleep Study 8/10/2018, 8/11/2018, 8/12/2018  Three night protocol was used. Data was adequate on of the studies. Data on the 2nd night was used for this  report. Duration was 6 hr 41 min. Lowest oxygen saturation was less than 70%. Cumulatively saturation was  below 90% saturation for 13% of the study duration. Average heart rate was 55 beats per minute.  Snoring was recorded by 50 decibels 100% of the time.  Apnea-hypopnea  index: AHI: 89.8 events per hour total events 601.  Severe obstructive sleep apnea-hypopnea syndrome.    Previous Report Reviewed: lab reports and office notes     Past Medical History: The following portions of the patient's history were reviewed and updated as appropriate:   She  has a past surgical history that includes  section, low transverse; Left breast lumpectomy (benign, 15yrs old); Hysterectomy; Oophorectomy; and Colonoscopy (N/A, 3/8/2017).  Her family history includes Arthritis in her mother; Breast cancer in her maternal aunt; Cancer in her paternal uncle; Heart disease in her father; Hypertension in her father and mother; Kidney disease in her father.  She  reports that she has never smoked. She has never used smokeless tobacco. She reports that she does not drink alcohol or use drugs.  She has a current medication list which includes the following prescription(s): alprazolam, amlodipine, aspirin, atenolol, chlorthalidone, furosemide, ibuprofen, cyclobenzaprine, levocetirizine, mometasone, and prednisone.  She has No Known Allergies.    The following portions of the patient's history were reviewed and updated as appropriate: allergies, current medications, past family history, past medical history, past social history, past surgical history and problem list.    Review of Systems   Constitutional: Negative for fever, chills, weight loss, weight gain, activity change, appetite change, fatigue and night sweats.   HENT: Negative for postnasal drip, rhinorrhea, sinus pressure, voice change and congestion.    Eyes: Negative for redness and itching.   Respiratory: Negative for snoring, cough, sputum production, chest tightness, shortness of breath, wheezing, orthopnea, asthma nighttime symptoms, dyspnea on extertion, use of rescue inhaler and somnolence.    Cardiovascular: Negative.  Negative for chest pain, palpitations and leg swelling.   Genitourinary: Negative for difficulty urinating and  hematuria.   Endocrine: Negative for cold intolerance and heat intolerance.    Musculoskeletal: Negative for arthralgias, gait problem, joint swelling and myalgias.   Skin: Negative.    Gastrointestinal: Negative for nausea, vomiting, abdominal pain and acid reflux.   Neurological: Negative for dizziness, weakness, light-headedness and headaches.   Hematological: Negative for adenopathy. No excessive bruising.   All other systems reviewed and are negative.     Objective:   /72   Pulse 61   Resp 17   Ht 5' (1.524 m)   Wt 129.1 kg (284 lb 8.1 oz)   LMP 06/16/2013   SpO2 96%   BMI 55.56 kg/m²   Physical Exam   Constitutional: She is oriented to person, place, and time. She appears well-developed and well-nourished. She is active and cooperative.  Non-toxic appearance. She does not appear ill. No distress.   HENT:   Head: Normocephalic.   Right Ear: External ear normal.   Left Ear: External ear normal.   Nose: Nose normal.   Mouth/Throat: Oropharynx is clear and moist. No oropharyngeal exudate.   Eyes: Conjunctivae are normal.   Neck: Normal range of motion. Neck supple.   Cardiovascular: Normal rate, regular rhythm, normal heart sounds and intact distal pulses.   Pulmonary/Chest: Effort normal and breath sounds normal. No stridor.   Abdominal: Soft.   Musculoskeletal: Normal range of motion.   Lymphadenopathy:     She has no cervical adenopathy.   Neurological: She is alert and oriented to person, place, and time.   Skin: Skin is warm and dry.   Psychiatric: She has a normal mood and affect. Her behavior is normal. Judgment and thought content normal.   Vitals reviewed.    Personal Diagnostic Review  CPAP download  CPAP 15 cm  Compliance Summary  7/29/2019 - 8/27/2019 (30 days)  Days with Device Usage 14 days  Days without Device Usage 16 days  Percent Days with Device Usage 46.7%  Cumulative Usage 2 days 17 hrs. 16 mins. 57 secs.  Maximum Usage (1 Day) 8 hrs. 21 mins. 47 secs.  Average Usage (All Days) 2  hrs. 10 mins. 33 secs.  Average Usage (Days Used) 4 hrs. 39 mins. 46 secs.  Minimum Usage (1 Day) 1 hrs. 37 mins. 53 secs.  Percent of Days with Usage >= 4 Hours 30.0%  Percent of Days with Usage < 4 Hours 70.0%  Date Range  Total Blower Time 2 days 17 hrs. 17 mins. 8 secs.  CPAP Summary  Average Time in Large Leak Per Day 0 secs.  Average AHI 9.5  CPAP 15.0 cmH2O    Assessment:     1. MARK on CPAP      Orders Placed This Encounter   Procedures    CPAP/BIPAP SUPPLIES     Benefits   90 day supply. 4 refills  HME: [facility]  Patient has dream wear nasal mask, would trial of Full face mask, since mouth breather. Needs appointment for Full face mask fitting.     Order Specific Question:   Type of mask:     Answer:   Nasal     Order Specific Question:   Headgear?     Answer:   Yes     Order Specific Question:   Tubing?     Answer:   Yes     Order Specific Question:   Humidifier chamber?     Answer:   Yes     Order Specific Question:   Chin strap?     Answer:   Yes     Order Specific Question:   Filters?     Answer:   Yes     Order Specific Question:   Cushions?     Answer:   Yes     Order Specific Question:   Length of need (1-99 months):     Answer:   99    HME - OTHER     Please change remotely to auto CPAP 7 - 20 cm   Hme: [facility]     Order Specific Question:   Type of Equipment:     Answer:   cpap     Order Specific Question:   Height:     Answer:   5' (1.524 m)     Order Specific Question:   Weight:     Answer:   129.1 kg (284 lb 8.1 oz)     Plan:     Problem List Items Addressed This Visit     Sleep-related bruxism     See dentist for mouth guard          MARK on CPAP - Primary     PSG split night study 2/2/2018 severe obstructive sleep apnea / hypopnea syndrome AHI 96.9. Cpap titration 15 cm.    Home Sleep Study 8/10/2018, 8/11/2018, 8/12/2018  Three night protocol was used.  Severe obstructive sleep apnea-hypopnea syndrome AHI: 89.8 events per hour total events 601.  On CPAP 15 cm   Nasal dream wear, 2/21/2020  patient request Full face mask.   With request for remote changed auto CPAP 7-20 cm   Adherence  HME: [facility]     .           Relevant Orders    CPAP/BIPAP SUPPLIES    HME - OTHER    Class 3 severe obesity due to excess calories with body mass index (BMI) of 50.0 to 59.9 in adult     Encouraged calorie reduction and 30 minutes of exercise daily. Discussed impact of obesity on general health.               TIME SPENT WITH PATIENT: Time spent:30 minutes in face to face  discussion concerning diagnosis, prognosis, review of lab and test results, benefits of treatment as well as management of disease, counseling of patient and coordination of care between various health  care providers . Greater than half the time spent was used for coordination of care and counseling of patient.      (DME) - [facility]  Reviewed therapeutic goals for positive airway pressure therapy Auto CPAP  Ideal is usage 100% of nights for 6 - 8 hours per night. Minimum usage is 70% of night for at least 4 hours per night used.     Follow up in about 6 months (around 8/21/2020) for CPAP 6 mo compliance download.     no...

## 2024-01-01 NOTE — OR NURSING
Family updated as to patient's status.-   
Final time out preformed for Patient and procedure verification agreed by all staff - RN, Tech, MD and CRNA.   
Pressure applied to abdomen  
See anesthesia record for MAC documentation including vital signs and medication administration.  
hard copy, drawn during this pregnancy

## 2024-02-22 DIAGNOSIS — R00.2 PALPITATION: ICD-10-CM

## 2024-02-22 DIAGNOSIS — I10 PRIMARY HYPERTENSION: Primary | ICD-10-CM

## 2024-03-12 ENCOUNTER — LAB VISIT (OUTPATIENT)
Dept: LAB | Facility: HOSPITAL | Age: 59
End: 2024-03-12
Attending: FAMILY MEDICINE
Payer: OTHER GOVERNMENT

## 2024-03-12 ENCOUNTER — OFFICE VISIT (OUTPATIENT)
Dept: FAMILY MEDICINE | Facility: CLINIC | Age: 59
End: 2024-03-12
Attending: FAMILY MEDICINE
Payer: COMMERCIAL

## 2024-03-12 VITALS
BODY MASS INDEX: 56.19 KG/M2 | DIASTOLIC BLOOD PRESSURE: 90 MMHG | HEIGHT: 60 IN | HEART RATE: 54 BPM | SYSTOLIC BLOOD PRESSURE: 152 MMHG | WEIGHT: 286.19 LBS | TEMPERATURE: 97 F | OXYGEN SATURATION: 98 %

## 2024-03-12 DIAGNOSIS — M10.9 GOUT, UNSPECIFIED CAUSE, UNSPECIFIED CHRONICITY, UNSPECIFIED SITE: ICD-10-CM

## 2024-03-12 DIAGNOSIS — I10 HYPERTENSION, UNSPECIFIED TYPE: ICD-10-CM

## 2024-03-12 DIAGNOSIS — E66.01 CLASS 3 SEVERE OBESITY DUE TO EXCESS CALORIES WITH BODY MASS INDEX (BMI) OF 50.0 TO 59.9 IN ADULT, UNSPECIFIED WHETHER SERIOUS COMORBIDITY PRESENT: ICD-10-CM

## 2024-03-12 DIAGNOSIS — M47.816 ARTHRITIS, LUMBAR SPINE: ICD-10-CM

## 2024-03-12 DIAGNOSIS — I73.9 PVD (PERIPHERAL VASCULAR DISEASE): ICD-10-CM

## 2024-03-12 DIAGNOSIS — Z78.0 POSTMENOPAUSAL: ICD-10-CM

## 2024-03-12 PROCEDURE — 99999 PR PBB SHADOW E&M-EST. PATIENT-LVL IV: CPT | Mod: PBBFAC,,, | Performed by: FAMILY MEDICINE

## 2024-03-12 PROCEDURE — 80053 COMPREHEN METABOLIC PANEL: CPT | Performed by: FAMILY MEDICINE

## 2024-03-12 PROCEDURE — 36415 COLL VENOUS BLD VENIPUNCTURE: CPT | Mod: PO | Performed by: FAMILY MEDICINE

## 2024-03-12 PROCEDURE — 81003 URINALYSIS AUTO W/O SCOPE: CPT | Performed by: FAMILY MEDICINE

## 2024-03-12 PROCEDURE — 99204 OFFICE O/P NEW MOD 45 MIN: CPT | Mod: S$GLB,,, | Performed by: FAMILY MEDICINE

## 2024-03-12 PROCEDURE — 80061 LIPID PANEL: CPT | Performed by: FAMILY MEDICINE

## 2024-03-12 PROCEDURE — 85025 COMPLETE CBC W/AUTO DIFF WBC: CPT | Performed by: FAMILY MEDICINE

## 2024-03-12 PROCEDURE — 99214 OFFICE O/P EST MOD 30 MIN: CPT | Mod: PBBFAC,PO | Performed by: FAMILY MEDICINE

## 2024-03-12 PROCEDURE — 84550 ASSAY OF BLOOD/URIC ACID: CPT | Performed by: FAMILY MEDICINE

## 2024-03-12 PROCEDURE — 84443 ASSAY THYROID STIM HORMONE: CPT | Performed by: FAMILY MEDICINE

## 2024-03-12 PROCEDURE — 83036 HEMOGLOBIN GLYCOSYLATED A1C: CPT | Performed by: FAMILY MEDICINE

## 2024-03-12 RX ORDER — MELATONIN 10 MG
TABLET, SUBLINGUAL SUBLINGUAL WEEKLY
COMMUNITY

## 2024-03-12 RX ORDER — ALLOPURINOL 300 MG/1
300 TABLET ORAL DAILY
COMMUNITY
End: 2024-03-12

## 2024-03-12 RX ORDER — ATENOLOL 100 MG/1
100 TABLET ORAL DAILY
Qty: 90 TABLET | Refills: 1 | Status: SHIPPED | OUTPATIENT
Start: 2024-03-12

## 2024-03-12 RX ORDER — CLONAZEPAM 0.5 MG/1
0.25 TABLET ORAL DAILY PRN
COMMUNITY

## 2024-03-12 RX ORDER — ALLOPURINOL 300 MG/1
300 TABLET ORAL DAILY
Qty: 90 TABLET | Refills: 1 | Status: SHIPPED | OUTPATIENT
Start: 2024-03-12

## 2024-03-12 NOTE — PROGRESS NOTES
Ingrid Johnson    Chief Complaint   Patient presents with    Establish Care       History of Present Illness:   Ms. Johnson comes in today as a new patient to establish care with me.  She states she has been previously followed by PCP Dr. Jacey mauro Melissa with [facility] with last visit on January 23, 2023.  She states she is fasting and has taken blood pressure medication today.      She states she does not exercise and sometimes monitors her diet.  She states she performs home blood pressure checks every other day using wrist monitor with level of 125/78 at last check.  She states she has been told she has prediabetes and was prescribed Ozempic in the past; however, she states she did not tolerate Ozempic due to GI issues.      She complains of having flare of gout at her right great toe a few weeks ago with help with pain with applying ice and taking dual action Advil-acetaminophen.  She states pain is 4/10 on pain scale today.  She states she has not been taking allopurinol but desires to restart taking it.  She also complains of having chronic low back pain likely due to OA (as noted on lumbar spine x-ray performed in 2018 -documented in epic).  She states she has received physical therapy in the past which was not helpful.  She desires to see pain management.    Otherwise, she denies having fever, chills, fatigue, appetite changes; shortness of breath, cough; chest pain, palpitations, leg swelling abdominal pain, nausea, vomiting, diarrhea, constipation; unusual urinary symptoms; polydipsia, polyphagia, polyuria, hot or cold intolerance; headache; anxiety, depression, homicidal or suicidal thoughts.               Past Medical History:   Diagnosis Date    Abnormal EKG 01/04/2018    Anxiety disorder, unspecified     Arthritis of lumbar spine     Dizziness 01/04/2018    Gout, unspecified     Hypertension     Low back pain     Obesity     Postmenopausal     Sleep apnea     questionable--snores    Snoring  01/04/2018    Vitamin D deficiency         Current Outpatient Medications   Medication Sig    atenoloL (TENORMIN) 100 MG tablet Take 1 tablet by mouth once daily    chlorthalidone (HYGROTEN) 25 MG Tab Take 1 tablet by mouth once daily    cholecalciferol, vitamin D3, 250 mcg (10,000 unit) Tab Take by mouth once a week.    clonazePAM (KLONOPIN) 0.5 MG tablet Take 0.25 mg by mouth daily as needed for Anxiety.    furosemide (LASIX) 20 MG tablet Take 20 mg by mouth daily as needed.    cyclobenzaprine (FLEXERIL) 10 MG tablet Take 10 mg by mouth 3 (three) times daily as needed for Muscle spasms. (Not currently taking)         Review of Systems   Constitutional:  Negative for activity change, appetite change, chills, fatigue, fever and unexpected weight change.   HENT:  Positive for hearing loss. Negative for rhinorrhea and trouble swallowing.    Eyes:  Negative for discharge and visual disturbance.   Respiratory:  Positive for wheezing. Negative for chest tightness.    Cardiovascular:  Negative for chest pain and palpitations.   Gastrointestinal:  Negative for blood in stool, constipation, diarrhea and vomiting.   Endocrine: Negative for polydipsia and polyuria.   Genitourinary:  Negative for difficulty urinating, dysuria, hematuria and menstrual problem.   Musculoskeletal:  Positive for arthralgias, back pain and joint swelling. Negative for neck pain.   Neurological:  Negative for weakness and headaches.   Psychiatric/Behavioral:  Negative for confusion and dysphoric mood.        Objective:  Physical Exam  Vitals reviewed.   Constitutional:       General: She is not in acute distress.     Appearance: Normal appearance. She is obese. She is not ill-appearing, toxic-appearing or diaphoretic.      Comments: Pleasant.   Cardiovascular:      Rate and Rhythm: Normal rate and regular rhythm.      Pulses: Normal pulses.      Heart sounds: No murmur heard.  Pulmonary:      Effort: Pulmonary effort is normal. No respiratory  distress.      Breath sounds: Normal breath sounds. No wheezing.   Abdominal:      General: Bowel sounds are normal. There is no distension.      Palpations: Abdomen is soft. There is no mass.      Tenderness: There is no abdominal tenderness. There is no guarding or rebound.   Musculoskeletal:         General: Tenderness present. No swelling. Normal range of motion.      Cervical back: Normal range of motion and neck supple. No tenderness.      Comments: She is ambulatory without problems. Tender low back with full range of motion noted. Non tender toes with full range of motion noted.   Lymphadenopathy:      Cervical: No cervical adenopathy.   Skin:     General: Skin is warm.   Neurological:      General: No focal deficit present.      Mental Status: She is alert and oriented to person, place, and time.   Psychiatric:         Mood and Affect: Mood normal.         Behavior: Behavior normal.         Thought Content: Thought content normal.         Judgment: Judgment normal.         ASSESSMENT:  1. Hypertension, unspecified type    2. PVD (peripheral vascular disease)    3. Arthritis, lumbar spine    4. Gout, unspecified cause, unspecified chronicity, unspecified site    5. Class 3 severe obesity due to excess calories with body mass index (BMI) of 50.0 to 59.9 in adult, unspecified whether serious comorbidity present    6. Postmenopausal        PLAN:  Ingrid was seen today for establish care.    Diagnoses and all orders for this visit:    Hypertension, unspecified type  -     Comprehensive Metabolic Panel; Future  -     CBC Auto Differential; Future  -     TSH; Future  -     Urinalysis  -     Lipid Panel; Future  -     atenoloL (TENORMIN) 100 MG tablet; Take 1 tablet (100 mg total) by mouth once daily.  -     Hemoglobin A1C; Future    PVD (peripheral vascular disease)    Arthritis, lumbar spine  -     Ambulatory referral/consult to Pain Clinic; Future    Gout, unspecified cause, unspecified chronicity, unspecified  site  -     Uric Acid; Future  -     allopurinoL (ZYLOPRIM) 300 MG tablet; Take 1 tablet (300 mg total) by mouth once daily.    Class 3 severe obesity due to excess calories with body mass index (BMI) of 50.0 to 59.9 in adult, unspecified whether serious comorbidity present    Postmenopausal      Patient advised to call for results.  Continue current medications, follow low sodium, low cholesterol, low carb diet, daily walks.  Prescription refill as noted above.  Restart Allopurinol 300 mg daily; medication precautions discussed with patient.  Keep follow up with specialists.  Follow up in about 4 months (around 7/12/2024) for physical. But, see me sooner if no improvement or worsening symptoms noted.

## 2024-03-13 LAB
ALBUMIN SERPL BCP-MCNC: 3.6 G/DL (ref 3.5–5.2)
ALP SERPL-CCNC: 101 U/L (ref 55–135)
ALT SERPL W/O P-5'-P-CCNC: 11 U/L (ref 10–44)
ANION GAP SERPL CALC-SCNC: 7 MMOL/L (ref 8–16)
AST SERPL-CCNC: 18 U/L (ref 10–40)
BASOPHILS # BLD AUTO: 0.05 K/UL (ref 0–0.2)
BASOPHILS NFR BLD: 0.8 % (ref 0–1.9)
BILIRUB SERPL-MCNC: 0.4 MG/DL (ref 0.1–1)
BILIRUB UR QL STRIP: NEGATIVE
BUN SERPL-MCNC: 21 MG/DL (ref 6–20)
CALCIUM SERPL-MCNC: 9.9 MG/DL (ref 8.7–10.5)
CHLORIDE SERPL-SCNC: 106 MMOL/L (ref 95–110)
CHOLEST SERPL-MCNC: 196 MG/DL (ref 120–199)
CHOLEST/HDLC SERPL: 3.5 {RATIO} (ref 2–5)
CLARITY UR REFRACT.AUTO: CLEAR
CO2 SERPL-SCNC: 29 MMOL/L (ref 23–29)
COLOR UR AUTO: YELLOW
CREAT SERPL-MCNC: 1 MG/DL (ref 0.5–1.4)
DIFFERENTIAL METHOD BLD: ABNORMAL
EOSINOPHIL # BLD AUTO: 0.3 K/UL (ref 0–0.5)
EOSINOPHIL NFR BLD: 4.5 % (ref 0–8)
ERYTHROCYTE [DISTWIDTH] IN BLOOD BY AUTOMATED COUNT: 17.9 % (ref 11.5–14.5)
EST. GFR  (NO RACE VARIABLE): >60 ML/MIN/1.73 M^2
ESTIMATED AVG GLUCOSE: 126 MG/DL (ref 68–131)
GLUCOSE SERPL-MCNC: 86 MG/DL (ref 70–110)
GLUCOSE UR QL STRIP: NEGATIVE
HBA1C MFR BLD: 6 % (ref 4–5.6)
HCT VFR BLD AUTO: 47.4 % (ref 37–48.5)
HDLC SERPL-MCNC: 56 MG/DL (ref 40–75)
HDLC SERPL: 28.6 % (ref 20–50)
HGB BLD-MCNC: 14 G/DL (ref 12–16)
HGB UR QL STRIP: NEGATIVE
IMM GRANULOCYTES # BLD AUTO: 0.01 K/UL (ref 0–0.04)
IMM GRANULOCYTES NFR BLD AUTO: 0.2 % (ref 0–0.5)
KETONES UR QL STRIP: NEGATIVE
LDLC SERPL CALC-MCNC: 124.6 MG/DL (ref 63–159)
LEUKOCYTE ESTERASE UR QL STRIP: NEGATIVE
LYMPHOCYTES # BLD AUTO: 1.8 K/UL (ref 1–4.8)
LYMPHOCYTES NFR BLD: 28 % (ref 18–48)
MCH RBC QN AUTO: 24.9 PG (ref 27–31)
MCHC RBC AUTO-ENTMCNC: 29.5 G/DL (ref 32–36)
MCV RBC AUTO: 84 FL (ref 82–98)
MONOCYTES # BLD AUTO: 0.6 K/UL (ref 0.3–1)
MONOCYTES NFR BLD: 8.9 % (ref 4–15)
NEUTROPHILS # BLD AUTO: 3.7 K/UL (ref 1.8–7.7)
NEUTROPHILS NFR BLD: 57.6 % (ref 38–73)
NITRITE UR QL STRIP: NEGATIVE
NONHDLC SERPL-MCNC: 140 MG/DL
NRBC BLD-RTO: 0 /100 WBC
PH UR STRIP: 7 [PH] (ref 5–8)
PLATELET # BLD AUTO: 285 K/UL (ref 150–450)
PMV BLD AUTO: 13 FL (ref 9.2–12.9)
POTASSIUM SERPL-SCNC: 4.6 MMOL/L (ref 3.5–5.1)
PROT SERPL-MCNC: 7.6 G/DL (ref 6–8.4)
PROT UR QL STRIP: NEGATIVE
RBC # BLD AUTO: 5.62 M/UL (ref 4–5.4)
SODIUM SERPL-SCNC: 142 MMOL/L (ref 136–145)
SP GR UR STRIP: 1.02 (ref 1–1.03)
TRIGL SERPL-MCNC: 77 MG/DL (ref 30–150)
TSH SERPL DL<=0.005 MIU/L-ACNC: 2.01 UIU/ML (ref 0.4–4)
URATE SERPL-MCNC: 9.5 MG/DL (ref 2.4–5.7)
URN SPEC COLLECT METH UR: NORMAL
WBC # BLD AUTO: 6.43 K/UL (ref 3.9–12.7)

## 2024-03-18 ENCOUNTER — PATIENT MESSAGE (OUTPATIENT)
Dept: FAMILY MEDICINE | Facility: CLINIC | Age: 59
End: 2024-03-18
Payer: COMMERCIAL

## 2024-03-18 ENCOUNTER — PATIENT MESSAGE (OUTPATIENT)
Dept: ADMINISTRATIVE | Facility: HOSPITAL | Age: 59
End: 2024-03-18
Payer: COMMERCIAL

## 2024-03-20 ENCOUNTER — TELEPHONE (OUTPATIENT)
Dept: FAMILY MEDICINE | Facility: CLINIC | Age: 59
End: 2024-03-20

## 2024-03-20 ENCOUNTER — PATIENT MESSAGE (OUTPATIENT)
Dept: FAMILY MEDICINE | Facility: CLINIC | Age: 59
End: 2024-03-20
Payer: COMMERCIAL

## 2024-03-20 ENCOUNTER — PATIENT OUTREACH (OUTPATIENT)
Dept: ADMINISTRATIVE | Facility: HOSPITAL | Age: 59
End: 2024-03-20
Payer: COMMERCIAL

## 2024-03-20 DIAGNOSIS — Z12.31 OTHER SCREENING MAMMOGRAM: ICD-10-CM

## 2024-03-20 NOTE — TELEPHONE ENCOUNTER
Advise pt lab results show stable findings including stable prediabetes and high uric acid levels . Please continue current medications. Thanks.

## 2024-03-28 PROBLEM — M47.816 ARTHRITIS, LUMBAR SPINE: Status: ACTIVE | Noted: 2024-03-28

## 2024-03-28 PROBLEM — M10.9 GOUT: Status: ACTIVE | Noted: 2024-03-28

## 2024-03-28 PROBLEM — Z78.0 POSTMENOPAUSAL: Status: ACTIVE | Noted: 2024-03-28

## 2024-04-24 ENCOUNTER — PATIENT MESSAGE (OUTPATIENT)
Dept: OTHER | Facility: OTHER | Age: 59
End: 2024-04-24
Payer: COMMERCIAL

## 2024-05-08 ENCOUNTER — HOSPITAL ENCOUNTER (OUTPATIENT)
Dept: RADIOLOGY | Facility: HOSPITAL | Age: 59
Discharge: HOME | End: 2024-05-08
Attending: FAMILY MEDICINE
Payer: OTHER GOVERNMENT

## 2024-05-08 VITALS — HEIGHT: 60 IN | BODY MASS INDEX: 56.19 KG/M2 | WEIGHT: 286.19 LBS

## 2024-05-08 DIAGNOSIS — Z12.31 OTHER SCREENING MAMMOGRAM: ICD-10-CM

## 2024-05-08 PROCEDURE — 77067 SCR MAMMO BI INCL CAD: CPT | Mod: 26,,, | Performed by: RADIOLOGY

## 2024-05-08 PROCEDURE — 77067 SCR MAMMO BI INCL CAD: CPT | Mod: TC

## 2024-05-08 PROCEDURE — 77063 BREAST TOMOSYNTHESIS BI: CPT | Mod: TC

## 2024-05-08 PROCEDURE — 77063 BREAST TOMOSYNTHESIS BI: CPT | Mod: 26,,, | Performed by: RADIOLOGY

## 2024-05-16 ENCOUNTER — PATIENT MESSAGE (OUTPATIENT)
Dept: ADMINISTRATIVE | Facility: OTHER | Age: 59
End: 2024-05-16
Payer: COMMERCIAL

## 2024-06-03 ENCOUNTER — PATIENT MESSAGE (OUTPATIENT)
Dept: FAMILY MEDICINE | Facility: CLINIC | Age: 59
End: 2024-06-03
Payer: COMMERCIAL

## 2024-06-04 ENCOUNTER — TELEPHONE (OUTPATIENT)
Dept: FAMILY MEDICINE | Facility: CLINIC | Age: 59
End: 2024-06-04
Payer: COMMERCIAL

## 2024-06-04 RX ORDER — INDOMETHACIN 25 MG/1
25 CAPSULE ORAL 3 TIMES DAILY
Qty: 15 CAPSULE | Refills: 0 | Status: SHIPPED | OUTPATIENT
Start: 2024-06-04 | End: 2024-06-09

## 2024-06-04 NOTE — TELEPHONE ENCOUNTER
I have put the following orders and/or medications to this note.  Please advise pt okay to take as directed for up to 5 days only.    No orders of the defined types were placed in this encounter.      Medications Ordered This Encounter   Medications    indomethacin (INDOCIN) 25 MG capsule     Sig: Take 1 capsule (25 mg total) by mouth 3 (three) times daily. For up to 5 days only for 5 days     Dispense:  15 capsule     Refill:  0

## 2024-06-04 NOTE — TELEPHONE ENCOUNTER
Devante Valverde     I am writing to request a prescription for Indomethacin 25mg.  I had this medication previously but never got a refill under my old primary care doctor. I was feeling better so I stop taking it.  These past several weeks I have been experiencing arthritis pain in my feet that won't seem to go away. I am having difficulty walking at times.  I already have arthritis in my back.  Thank you for any assistance.

## 2024-07-16 NOTE — PROGRESS NOTES
New Patient Interventional Pain Note (Initial Visit)    Referring Physician: Nakita Valverde MD    PCP: Nakita Valverde MD    Chief Complaint:   Chief Complaint   Patient presents with    Low-back Pain    Leg Pain    Knee Pain        SUBJECTIVE:    Ingrid Johnson is a 59 y.o. female with past medical history significant for hypertension, peripheral vascular disease, obesity, obstructive sleep apnea on CPAP who presents to the clinic for the evaluation of lower back and leg pain.  Patient reports pain has been present for several years without inciting accident injury or trauma.  Of note patient is a  at [employer] and believes her symptoms began from prolonged sitting daily.  Today she reports pain which is constant which is rated an 8/10.  Pain at its best is a 5/10 and at its worse is a 10/10.  Pain is described as sharp, stabbing, aching, burning in nature.  Today she reports pain in a bandlike distribution in the lower back which can radiate down the posterior aspects of bilateral lower extremities in L5-S1 distribution to the feet.  Pain can be exacerbated with positional changes, moving from sitting to standing and with walking.  Patient is able to only ambulate a few minutes before requiring rest.  Pain is improved with sitting.  Patient is also taking Indocin with moderate improvement in her pain.  She does not take this medication daily.  She also reports diagnosis of gout, which typically affects her right foot.  Patient has performed physician directed physical therapy exercises at home for lower back, leg and knee pain over the last 8 weeks from 05/17/2024 through 07/17/2024 with marginal improvement in pain, range of motion functionality.  Patient has not tried membrane stabilizing agents or interventional treatment.    Patient reports significant motor weakness.  Patient denies night fever/night sweats, urinary incontinence, bowel incontinence, significant weight loss, and  loss of sensations.      Pain Disability Index Review:         2024     9:25 AM   Last 3 PDI Scores   Pain Disability Index (PDI) 59       Non-Pharmacologic Treatments:  Physical Therapy/Home Exercise: yes  Ice/Heat:yes  TENS: no  Acupuncture: no  Massage: no  Chiropractic: no    Other: yes; relaxation technique      Pain Medications:  - Adjuvant Medications: Clonazepam (Klonopin) and Cyclobenzaprine (Flexeril)    Pain Procedures:   None    Past Medical History:   Diagnosis Date    Abnormal EKG 2018    Anxiety disorder, unspecified     Arthritis of lumbar spine     Dizziness 2018    Gout, unspecified     Hypertension     Low back pain     Obesity     Postmenopausal     Sleep apnea     questionable--snores    Snoring 2018    Vitamin D deficiency      Past Surgical History:   Procedure Laterality Date     SECTION, LOW TRANSVERSE      x 4; 1 possible classical c section    COLONOSCOPY N/A 3/8/2017    Procedure: COLONOSCOPY;  Surgeon: Paul Hamlin III, MD;  Location: [facility];  Service: Endoscopy;  Laterality: N/A;    HYSTERECTOMY      Left breast lumpectomy (benign, 15yrs old)      OOPHORECTOMY      LSO     Review of patient's allergies indicates:  No Known Allergies    Current Outpatient Medications   Medication Sig    allopurinoL (ZYLOPRIM) 300 MG tablet Take 1 tablet (300 mg total) by mouth once daily.    atenoloL (TENORMIN) 100 MG tablet Take 1 tablet (100 mg total) by mouth once daily.    chlorthalidone (HYGROTEN) 25 MG Tab Take 1 tablet by mouth once daily    cholecalciferol, vitamin D3, 250 mcg (10,000 unit) Tab Take by mouth once a week.    clonazePAM (KLONOPIN) 0.5 MG tablet Take 0.25 mg by mouth daily as needed for Anxiety.    cyclobenzaprine (FLEXERIL) 10 MG tablet Take 10 mg by mouth 3 (three) times daily as needed for Muscle spasms.    furosemide (LASIX) 20 MG tablet Take 20 mg by mouth daily as needed.    indomethacin (INDOCIN) 25 MG capsule     gabapentin (NEURONTIN)  300 MG capsule Take 1 capsule (300 mg total) by mouth every evening for 3 days, THEN 1 capsule (300 mg total) 2 (two) times daily for 3 days, THEN 1 capsule (300 mg total) 3 (three) times daily for 3 days, THEN 2 capsules (600 mg total) 2 (two) times daily for 3 days, THEN 2 capsules (600 mg total) 3 (three) times daily for 18 days.     No current facility-administered medications for this visit.         ROS:  GENERAL:  No weight loss, malaise or fevers.  HEENT:   No recent changes in vision or hearing  NECK:  Negative for lumps, no difficulty with swallowing.  RESPIRATORY:  Negative for cough, wheezing or shortness of breath, patient denies any recent URI.  CARDIOVASCULAR:  Negative for chest pain or palpitations.  GI:  Negative for abdominal discomfort, blood in stools or black stools or change in bowel habits.  MUSCULOSKELETAL:  See HPI.  SKIN:  Negative for lesions, rash, and itching.  PSYCH:  No mood disorder or recent psychosocial stressors.   HEMATOLOGY/LYMPHOLOGY:  Negative for prolonged bleeding, bruising easily or swollen nodes.    NEURO:   No history of syncope, paralysis, seizures or tremors.  All other reviewed and negative other than HPI.    OBJECTIVE:    BP (!) 147/89   Pulse (!) 57   Ht 5' (1.524 m)   Wt 125.2 kg (276 lb 0.3 oz)   LMP 06/16/2013   BMI 53.91 kg/m²       Physical Exam:    GENERAL: Well appearing, in no acute distress, alert and oriented x3.  PSYCH:  Mood and affect appropriate.  SKIN: Skin color, texture, turgor normal, no rashes or lesions.  HEAD/FACE:  Normocephalic, atraumatic. Cranial nerves grossly intact.      CV: RRR with palpation of the radial artery.  PULM: No evidence of respiratory difficulty, symmetric chest rise.  GI:  Soft and non-tender.    Knee Exam:  bilateral    Erythema: Absent  Deformity/Swelling: Absent  Effusion: Absent  Chronic Bony Changes: Present on R  Creptius: Absent     milddiffuse tenderness palpation of the mediolateral joint lines and patella      ROM: full range of motion     Strength is 5/5 bilateral     Meniscus   Ritu:  Medial - negative Lateral - negative    Stability Lachman: normal PCL-Posterior Drawer: normal  MCL - Valgus: normal  LCL - Varus: normal     Patella   Patellar apprehension: positive  Patellar Tracking: normal     Neurovascular intact        BACK: Straight leg raising in the sitting and supine positions is negative to radicular pain. No pain to palpation over the facet joints of the lumbar spine or spinous processes. Normal range of motion without pain reproduction.  EXTREMITIES: Peripheral joint ROM is full and pain free without obvious instability or laxity in all four extremities. No deformities, edema, or skin discoloration. Good capillary refill.  MUSCULOSKELETAL: Able to stand on heels & toes.   Shoulder, hip, and knee provocative maneuvers are negative.  There is no pain with palpation over the sacroiliac joints bilaterally.  Gaenslen's, Distraction/Compression and  FABERs test is negative.  Facet loading test is negative bilaterally.   Bilateral upper and lower extremity strength is normal and symmetric.  No atrophy or tone abnormalities are noted.    RIGHT Lower extremity: Hip flexion 5/5, Hip Abduction 5/5, Hip Adduction 5/5, Knee extension 5/5, Knee flexion 5/5, Ankle dorsiflexion5/5, Extensor hallucis longus 5/5, Ankle plantarflexion 5/5  LEFT Lower extremity:  Hip flexion 5/5, Hip Abduction 5/5,Hip Adduction 5/5, Knee extension 5/5, Knee flexion 5/5, Ankle dorsiflexion 5/5, Extensor hallucis longus 5/5, Ankle plantarflexion 5/5  -Normal testing knee (patellar) jerk and ankle (achilles) jerk    NEURO: Bilateral upper and lower extremity coordination and muscle stretch reflexes are physiologic and symmetric. No loss of sensation is noted.  GAIT: normal.    Imaging:   None in system      ASSESSMENT: 59 y.o. year old female with   1. Primary osteoarthritis of both knees  X-Ray Knee AP Standing Bilateral    Case  Request-RAD/Other Procedure Area: Bilateral intraarticular knee injection with local    Ambulatory referral/consult to Physical/Occupational Therapy      2. Arthritis, lumbar spine  Ambulatory referral/consult to Pain Clinic      3. Lumbar spondylosis  X-Ray Lumbar Complete Including Flex And Ext    Ambulatory referral/consult to Physical/Occupational Therapy      4. Lumbar radiculopathy, chronic  MRI Lumbar Spine Without Contrast    Ambulatory referral/consult to Physical/Occupational Therapy            PLAN:   - Interventions:  Schedule for bilateral intra-articular knee injection for knee pain secondary to osteoarthritis. Explained the risks and benefits of the procedure in detail with the patient today in clinic along with alternative treatment options, and the patient elected to pursue the intervention at this time.      - Anticoagulation use: No no anticoagulation    -we have discussed considering bilateral L3-5 lumbar medial branch block to address axial back pain versus lumbar transforaminal epidural steroid injection for lumbar radiculopathy.  We will 1st review lumbar x-ray and MRI.     report:  Reviewed and consistent with medication use as prescribed.    - Medications:   -  We have discussed starting gabapentin to a more therapeutic goal.  Patient will increase his medication according to the following algorithm.  We have reviewed potential side effects of this medication including daytime somnolence, weight gain and peripheral edema  Gabapentin Titration:  Day 1: 300mg qHS  Day 4: 300mg AM, 300mg qHS  Day 7: 300mg TID  Day 10: 300mg AM, 300mg afternoon, 600mg qHS  Day 13: 300mg AM, 600mg afternoon, 600mg qHS  Day 16+: and after 600mg TID      - Therapy:   We discussed initiating physical therapy to help manage the patient/s painful condition. The patient was counseled that muscle strengthening will improve the long term prognosis in regards to pain and may also help increase range of motion and  mobility. They were told that one of the goals of physical therapy is that they learn how to do the exercises so that they can do them independently at home daily upon completion. The patient's questions were answered and they were agreeable to this course. A referral for physical therapy was provided to the patient.    - Imaging:  Bilateral knee x-ray, lumbar x-ray and lumbar MRI to better evaluate pain and weakness      - Follow up visit: return to clinic in 4-6 weeks    Visit today included increased complexity associated with the care of the episodic problem of chronic pain which was addressed and continue to manage the longitudinal care of the patient due to the serious and/or complex managed problem(s) listed above.      The above plan and management options were discussed at length with patient. Patient is in agreement with the above and verbalized understanding.    - I discussed the goals of interventional chronic pain management with the patient on today's visit. We discussed a multimodal and systematic approach to pain.  This includes diagnostic and therapeutic injections, adjuvant pharmacologic treatment, physical therapy, and at times psychiatry.  I emphasized the importance of regular exercise, core strengthening and stretching, diet and weight loss as a cornerstone of long-term pain management.    - This condition does not require this patient to take time off of work, and the primary goal of our Pain Management services is to improve the patient's functional capacity.  - Patient Questions: Answered all of the patient's questions regarding diagnoses, therapy, treatment and next steps        Brant Whatley MD  Interventional Pain Management  [facility]    Disclaimer:  This note was prepared using voice recognition system and is likely to have sound alike errors that may have been overlooked even after proof reading.  Please call me with any questions

## 2024-07-17 ENCOUNTER — OFFICE VISIT (OUTPATIENT)
Dept: PAIN MEDICINE | Facility: CLINIC | Age: 59
End: 2024-07-17
Attending: FAMILY MEDICINE
Payer: COMMERCIAL

## 2024-07-17 ENCOUNTER — HOSPITAL ENCOUNTER (OUTPATIENT)
Dept: RADIOLOGY | Facility: HOSPITAL | Age: 59
Discharge: HOME | End: 2024-07-17
Attending: ANESTHESIOLOGY
Payer: COMMERCIAL

## 2024-07-17 VITALS
WEIGHT: 276 LBS | HEIGHT: 60 IN | DIASTOLIC BLOOD PRESSURE: 89 MMHG | SYSTOLIC BLOOD PRESSURE: 147 MMHG | BODY MASS INDEX: 54.19 KG/M2 | HEART RATE: 57 BPM

## 2024-07-17 DIAGNOSIS — M47.816 LUMBAR SPONDYLOSIS: ICD-10-CM

## 2024-07-17 DIAGNOSIS — M17.0 PRIMARY OSTEOARTHRITIS OF BOTH KNEES: Primary | ICD-10-CM

## 2024-07-17 DIAGNOSIS — M54.16 LUMBAR RADICULOPATHY, CHRONIC: ICD-10-CM

## 2024-07-17 DIAGNOSIS — M47.816 ARTHRITIS, LUMBAR SPINE: ICD-10-CM

## 2024-07-17 DIAGNOSIS — M17.0 PRIMARY OSTEOARTHRITIS OF BOTH KNEES: ICD-10-CM

## 2024-07-17 PROCEDURE — 3077F SYST BP >= 140 MM HG: CPT | Mod: CPTII,S$GLB,, | Performed by: ANESTHESIOLOGY

## 2024-07-17 PROCEDURE — 99999 PR PBB SHADOW E&M-EST. PATIENT-LVL V: CPT | Mod: PBBFAC,,, | Performed by: ANESTHESIOLOGY

## 2024-07-17 PROCEDURE — G2211 COMPLEX E/M VISIT ADD ON: HCPCS | Mod: S$GLB,,, | Performed by: ANESTHESIOLOGY

## 2024-07-17 PROCEDURE — 3079F DIAST BP 80-89 MM HG: CPT | Mod: CPTII,S$GLB,, | Performed by: ANESTHESIOLOGY

## 2024-07-17 PROCEDURE — 72114 X-RAY EXAM L-S SPINE BENDING: CPT | Mod: 26,,, | Performed by: RADIOLOGY

## 2024-07-17 PROCEDURE — 73565 X-RAY EXAM OF KNEES: CPT | Mod: TC

## 2024-07-17 PROCEDURE — 3044F HG A1C LEVEL LT 7.0%: CPT | Mod: CPTII,S$GLB,, | Performed by: ANESTHESIOLOGY

## 2024-07-17 PROCEDURE — 99204 OFFICE O/P NEW MOD 45 MIN: CPT | Mod: S$GLB,,, | Performed by: ANESTHESIOLOGY

## 2024-07-17 PROCEDURE — 72114 X-RAY EXAM L-S SPINE BENDING: CPT | Mod: TC

## 2024-07-17 PROCEDURE — 1159F MED LIST DOCD IN RCRD: CPT | Mod: CPTII,S$GLB,, | Performed by: ANESTHESIOLOGY

## 2024-07-17 PROCEDURE — 73565 X-RAY EXAM OF KNEES: CPT | Mod: 26,,, | Performed by: RADIOLOGY

## 2024-07-17 PROCEDURE — 3008F BODY MASS INDEX DOCD: CPT | Mod: CPTII,S$GLB,, | Performed by: ANESTHESIOLOGY

## 2024-07-17 RX ORDER — GABAPENTIN 300 MG/1
CAPSULE ORAL
Qty: 138 CAPSULE | Refills: 0 | Status: SHIPPED | OUTPATIENT
Start: 2024-07-17 | End: 2024-08-16

## 2024-07-17 RX ORDER — INDOMETHACIN 25 MG/1
CAPSULE ORAL
COMMUNITY
Start: 2024-06-04

## 2024-07-19 ENCOUNTER — TELEPHONE (OUTPATIENT)
Dept: PAIN MEDICINE | Facility: CLINIC | Age: 59
End: 2024-07-19
Payer: COMMERCIAL

## 2024-07-19 RX ORDER — DIAZEPAM 10 MG/1
10 TABLET ORAL ONCE
COMMUNITY
End: 2024-07-19 | Stop reason: SDUPTHER

## 2024-07-19 RX ORDER — DIAZEPAM 10 MG/1
10 TABLET ORAL ONCE
Qty: 1 TABLET | Refills: 0 | Status: SHIPPED | OUTPATIENT
Start: 2024-07-19 | End: 2024-07-19

## 2024-07-19 NOTE — TELEPHONE ENCOUNTER
----- Message from Giana Flores sent at 7/19/2024 12:25 PM CDT -----  Regarding: Medicine.  Good afternoon,    Ms. Johnson is doing an MRI on Monday and she would like to get prescribed some medicine to help her calm down while in the machine. Can you please place a prescription in for her and call her when it has been placed?      Thanks,  Giana  Radiology Scheduler  [phone]

## 2024-07-19 NOTE — TELEPHONE ENCOUNTER
RX request for a Valium pill for pt MRI on Monday.       Call patient to inform them that they can  their medication from the pharmacy. P.t  did not answer left isidro Mendoza   Medical Assistant

## 2024-07-22 ENCOUNTER — HOSPITAL ENCOUNTER (OUTPATIENT)
Dept: RADIOLOGY | Facility: HOSPITAL | Age: 59
Discharge: HOME | End: 2024-07-22
Attending: ANESTHESIOLOGY
Payer: COMMERCIAL

## 2024-07-22 DIAGNOSIS — M54.16 LUMBAR RADICULOPATHY, CHRONIC: ICD-10-CM

## 2024-07-22 PROCEDURE — 72148 MRI LUMBAR SPINE W/O DYE: CPT | Mod: 26,,, | Performed by: RADIOLOGY

## 2024-07-22 PROCEDURE — 72148 MRI LUMBAR SPINE W/O DYE: CPT | Mod: TC

## 2024-07-26 ENCOUNTER — HOSPITAL ENCOUNTER (OUTPATIENT)
Dept: CARDIOLOGY | Facility: HOSPITAL | Age: 59
Discharge: HOME | End: 2024-07-26
Attending: INTERNAL MEDICINE
Payer: COMMERCIAL

## 2024-07-26 ENCOUNTER — OFFICE VISIT (OUTPATIENT)
Dept: CARDIOLOGY | Facility: CLINIC | Age: 59
End: 2024-07-26
Payer: COMMERCIAL

## 2024-07-26 VITALS
OXYGEN SATURATION: 98 % | SYSTOLIC BLOOD PRESSURE: 171 MMHG | WEIGHT: 282.44 LBS | DIASTOLIC BLOOD PRESSURE: 97 MMHG | BODY MASS INDEX: 55.45 KG/M2 | HEART RATE: 57 BPM | HEIGHT: 60 IN | RESPIRATION RATE: 16 BRPM

## 2024-07-26 DIAGNOSIS — R00.2 PALPITATION: ICD-10-CM

## 2024-07-26 DIAGNOSIS — G47.33 OSA ON CPAP: ICD-10-CM

## 2024-07-26 DIAGNOSIS — I73.9 PVD (PERIPHERAL VASCULAR DISEASE): ICD-10-CM

## 2024-07-26 DIAGNOSIS — R06.02 SOB (SHORTNESS OF BREATH): Primary | ICD-10-CM

## 2024-07-26 DIAGNOSIS — R94.31 ABNORMAL EKG: ICD-10-CM

## 2024-07-26 DIAGNOSIS — I10 PRIMARY HYPERTENSION: ICD-10-CM

## 2024-07-26 DIAGNOSIS — E66.01 CLASS 3 SEVERE OBESITY DUE TO EXCESS CALORIES WITHOUT SERIOUS COMORBIDITY WITH BODY MASS INDEX (BMI) OF 50.0 TO 59.9 IN ADULT: ICD-10-CM

## 2024-07-26 PROCEDURE — 99999 PR PBB SHADOW E&M-EST. PATIENT-LVL IV: CPT | Mod: PBBFAC,,, | Performed by: INTERNAL MEDICINE

## 2024-07-26 PROCEDURE — 93005 ELECTROCARDIOGRAM TRACING: CPT

## 2024-07-26 PROCEDURE — 93010 ELECTROCARDIOGRAM REPORT: CPT | Mod: ,,, | Performed by: INTERNAL MEDICINE

## 2024-07-26 RX ORDER — AMLODIPINE BESYLATE 5 MG/1
5 TABLET ORAL DAILY
Qty: 90 TABLET | Refills: 3 | Status: SHIPPED | OUTPATIENT
Start: 2024-07-26 | End: 2025-07-26

## 2024-07-26 NOTE — PROGRESS NOTES
Subjective:   Patient ID:  Ingrid Johnson is a 59 y.o. female who presents for evaluation of No chief complaint on file.      58 yo female, came in for sob.  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  PMH HTN, MARK on CPAP and obesity. No smoking/dsrinking   visit. C/o palpitation daily for a week. On daytime. Lasted for minutes. Associated with dizziness, slight chest tightness. No faint and dyspnea.   Dry cough for two weeks, and started amlodipine/Benazepril recently.   Leg swelling worse in PM, taking Lasix once a week prn for leg swelling  Med compliance.  Echo in  NSR and LVH  mpi no ischemia    Palpitation with exertion, worse, some dizziness. No faint. Lasted for minutes. 2 times a week.  With chest heaviness.   Leg swelling mild.   A lot of lower back pain.   Does drink coffee/  EKG nSR poor R progression in precordial leads  No regular exercise    at [employer]    Interval history  Saw her in 2021. C/o SOB at exertion, sleeps on 2 pillows and no PND.   No chest pain dizziness palpitation and leg swelling  EKG reviewed by myself today NSR Q wave in inferior leads and poor R progression on precordial leads.   BP high   04/21 echo showed EF nml. ETT no ischemia  Cpap recall                Results for orders placed during the hospital encounter of 04/15/21    Echo Color Flow Doppler? Yes    Interpretation Summary  · The left ventricle is normal in size with concentric remodeling and normal systolic function.  · The estimated ejection fraction is 60%.  · Normal left ventricular diastolic function.  · With normal right ventricular systolic function.  · Normal central venous pressure (3 mmHg).  · The estimated PA systolic pressure is 8 mmHg.     Results for orders placed during the hospital encounter of 04/15/21    Exercise Stress - EKG    Interpretation Summary    The EKG portion of this study is negative for ischemia. Sensitivity is reduced secondary to the target heart rate not being achieved.    The  patient reported no chest pain during the stress test.    The exercise capacity was severely impaired.       Past Medical History:   Diagnosis Date    Abnormal EKG 2018    Anxiety disorder, unspecified     Arthritis of lumbar spine     Dizziness 2018    Gout, unspecified     Hypertension     Low back pain     Obesity     Postmenopausal     Sleep apnea     questionable--snores    Snoring 2018    Vitamin D deficiency        Past Surgical History:   Procedure Laterality Date     SECTION, LOW TRANSVERSE      x 4; 1 possible classical c section    COLONOSCOPY N/A 3/8/2017    Procedure: COLONOSCOPY;  Surgeon: Paul Hamlin III, MD;  Location: [facility];  Service: Endoscopy;  Laterality: N/A;    HYSTERECTOMY      Left breast lumpectomy (benign, 15yrs old)      OOPHORECTOMY      LSO       Social History     Tobacco Use    Smoking status: Never    Smokeless tobacco: Never   Substance Use Topics    Alcohol use: No    Drug use: No       Family History   Problem Relation Name Age of Onset    Arthritis Mother      Hypertension Mother      Kidney disease Father      Heart disease Father      Hypertension Father      Hypertension Sister      Diabetes Sister      Hypertension Brother      Diabetes Brother      Breast cancer Maternal Aunt      Cancer Paternal Uncle          Prostate    No Known Problems Son      No Known Problems Daughter      Colon cancer Neg Hx         Review of Systems   Constitutional: Negative for decreased appetite, diaphoresis, fever, malaise/fatigue and night sweats.   HENT:  Negative for nosebleeds.    Eyes:  Negative for blurred vision and double vision.   Cardiovascular:  Positive for dyspnea on exertion. Negative for chest pain, claudication, irregular heartbeat, leg swelling, near-syncope, orthopnea, palpitations, paroxysmal nocturnal dyspnea and syncope.   Respiratory:  Positive for shortness of breath. Negative for cough, snoring, sputum production and wheezing.     Endocrine: Negative for cold intolerance and polyuria.   Hematologic/Lymphatic: Does not bruise/bleed easily.   Skin:  Negative for rash.   Musculoskeletal:  Negative for back pain, falls, joint pain, joint swelling and neck pain.   Gastrointestinal:  Negative for abdominal pain, heartburn, nausea and vomiting.   Genitourinary:  Negative for dysuria, frequency and hematuria.   Neurological:  Negative for difficulty with concentration, dizziness, focal weakness, headaches, light-headedness, numbness, seizures and weakness.   Psychiatric/Behavioral:  Negative for depression, memory loss and substance abuse. The patient does not have insomnia.    Allergic/Immunologic: Negative for HIV exposure and hives.       Objective:   Physical Exam  HENT:      Head: Normocephalic.   Eyes:      Pupils: Pupils are equal, round, and reactive to light.   Neck:      Thyroid: No thyromegaly.      Vascular: Normal carotid pulses. No carotid bruit or JVD.   Cardiovascular:      Rate and Rhythm: Normal rate and regular rhythm. No extrasystoles are present.     Chest Wall: PMI is not displaced.      Pulses: Normal pulses.      Heart sounds: Normal heart sounds. No murmur heard.     No gallop. No S3 sounds.   Pulmonary:      Effort: No respiratory distress.      Breath sounds: Normal breath sounds. No stridor.   Abdominal:      General: Bowel sounds are normal.      Palpations: Abdomen is soft.      Tenderness: There is no abdominal tenderness. There is no rebound.   Musculoskeletal:         General: Normal range of motion.   Skin:     Findings: No rash.   Neurological:      Mental Status: She is alert and oriented to person, place, and time.   Psychiatric:         Behavior: Behavior normal.         Lab Results   Component Value Date    CHOL 196 03/12/2024    CHOL 230 (H) 01/23/2023    CHOL 218 (H) 12/29/2021     Lab Results   Component Value Date    HDL 56 03/12/2024    HDL 62 01/23/2023    HDL 61 12/29/2021     Lab Results   Component  "Value Date    LDLCALC 124.6 03/12/2024    LDLCALC 150 (H) 01/23/2023    LDLCALC 142 (H) 12/29/2021     Lab Results   Component Value Date    TRIG 77 03/12/2024    TRIG 102 01/23/2023    TRIG 86 12/29/2021     Lab Results   Component Value Date    CHOLHDL 28.6 03/12/2024       Chemistry        Component Value Date/Time     03/12/2024 1313    K 4.6 03/12/2024 1313     03/12/2024 1313    CO2 29 03/12/2024 1313    BUN 21 (H) 03/12/2024 1313    CREATININE 1.0 03/12/2024 1313    GLU 86 03/12/2024 1313        Component Value Date/Time    CALCIUM 9.9 03/12/2024 1313    ALKPHOS 101 03/12/2024 1313    AST 18 03/12/2024 1313    ALT 11 03/12/2024 1313    BILITOT 0.4 03/12/2024 1313    ESTGFRAFRICA >60 06/26/2013 1044    EGFRNONAA 59 (L) 06/26/2013 1044          Lab Results   Component Value Date    HGBA1C 6.0 (H) 03/12/2024     Lab Results   Component Value Date    TSH 2.007 03/12/2024     No results found for: "INR", "PROTIME"  Lab Results   Component Value Date    WBC 6.43 03/12/2024    HGB 14.0 03/12/2024    HCT 47.4 03/12/2024    MCV 84 03/12/2024     03/12/2024     BNP  @LABRCNTIP(BNP,BNPTRIAGEBLO)@  CrCl cannot be calculated (Patient's most recent lab result is older than the maximum 7 days allowed.).  No results found in the last 24 hours.  No results found in the last 24 hours.  No results found in the last 24 hours.    Assessment:      1. PVD (peripheral vascular disease)    2. Palpitation    3. Primary hypertension    4. Abnormal EKG    5. Class 3 severe obesity due to excess calories without serious comorbidity with body mass index (BMI) of 50.0 to 59.9 in adult    6. MARK on CPAP        Plan:     Continue atenolol and chlorthalidone  Add amlodipine 5 mg daily  Check BNP   Check echo     Counseled DASH  Check Lipid profile with PCP in 6 months  Recommend heart-healthy diet, weight control and regular exercise.  Eduarda. Risk modification.   I have reviewed all pertinent labs and cardiac studies " independently. Plans and recommendations have been formulated under my direct supervision. All questions answered and patient voiced understanding.   If symptoms persist go to the ED  RTC in 2 m

## 2024-07-27 LAB
OHS QRS DURATION: 90 MS
OHS QTC CALCULATION: 399 MS

## 2024-07-29 ENCOUNTER — TELEPHONE (OUTPATIENT)
Dept: CARDIOLOGY | Facility: CLINIC | Age: 59
End: 2024-07-29
Payer: COMMERCIAL

## 2024-07-29 NOTE — TELEPHONE ENCOUNTER
Attempted to contact patient regarding results lvm for patient to call back.    ----- Message from Mohsen England MD sent at 7/28/2024  7:07 PM CDT -----  BNP level 44, suggesting no CHF

## 2024-07-29 NOTE — TELEPHONE ENCOUNTER
Contacted patient;patient received and understood results with no questions or concerns.      ----- Message from Linda Wood sent at 7/29/2024  9:52 AM CDT -----  Contact: Ingrid  Type:  Patient Returning Call    Who Called:Ingrid   Who Left Message for Patient:Darlyn  Does the patient know what this is regarding?: results  Would the patient rather a call back or a response via [facility]?  Call back   Best Call Back Number: [phone]  Additional Information:     Thanks   Am

## 2024-08-02 ENCOUNTER — HOSPITAL ENCOUNTER (OUTPATIENT)
Facility: HOSPITAL | Age: 59
Discharge: HOME | End: 2024-08-02
Attending: ANESTHESIOLOGY | Admitting: ANESTHESIOLOGY
Payer: COMMERCIAL

## 2024-08-02 VITALS
TEMPERATURE: 98 F | WEIGHT: 282.63 LBS | HEART RATE: 60 BPM | RESPIRATION RATE: 18 BRPM | BODY MASS INDEX: 55.49 KG/M2 | OXYGEN SATURATION: 97 % | DIASTOLIC BLOOD PRESSURE: 93 MMHG | HEIGHT: 60 IN | SYSTOLIC BLOOD PRESSURE: 163 MMHG

## 2024-08-02 DIAGNOSIS — M17.9 KNEE OSTEOARTHRITIS: ICD-10-CM

## 2024-08-02 PROCEDURE — 25500020 PHARM REV CODE 255: Performed by: ANESTHESIOLOGY

## 2024-08-02 PROCEDURE — 20610 DRAIN/INJ JOINT/BURSA W/O US: CPT | Mod: 50 | Performed by: ANESTHESIOLOGY

## 2024-08-02 PROCEDURE — 63600175 PHARM REV CODE 636 W HCPCS: Performed by: ANESTHESIOLOGY

## 2024-08-02 PROCEDURE — 25000003 PHARM REV CODE 250: Performed by: ANESTHESIOLOGY

## 2024-08-02 PROCEDURE — 77002 NEEDLE LOCALIZATION BY XRAY: CPT | Mod: 26,,, | Performed by: ANESTHESIOLOGY

## 2024-08-02 PROCEDURE — 20610 DRAIN/INJ JOINT/BURSA W/O US: CPT | Mod: 50,,, | Performed by: ANESTHESIOLOGY

## 2024-08-02 RX ORDER — TRIAMCINOLONE ACETONIDE 40 MG/ML
INJECTION, SUSPENSION INTRA-ARTICULAR; INTRAMUSCULAR
Status: DISCONTINUED | OUTPATIENT
Start: 2024-08-02 | End: 2024-08-02 | Stop reason: HOSPADM

## 2024-08-02 RX ORDER — BUPIVACAINE HYDROCHLORIDE 2.5 MG/ML
INJECTION, SOLUTION EPIDURAL; INFILTRATION; INTRACAUDAL
Status: DISCONTINUED | OUTPATIENT
Start: 2024-08-02 | End: 2024-08-02 | Stop reason: HOSPADM

## 2024-08-02 RX ORDER — INDOMETHACIN 25 MG/1
CAPSULE ORAL
Status: DISCONTINUED | OUTPATIENT
Start: 2024-08-02 | End: 2024-08-02 | Stop reason: HOSPADM

## 2024-08-12 DIAGNOSIS — I10 HYPERTENSION, UNSPECIFIED TYPE: ICD-10-CM

## 2024-08-12 NOTE — TELEPHONE ENCOUNTER
No care due was identified.  [facility] Embedded Care Due Messages. Reference number: [id number].   8/12/2024 9:30:56 AM CDT

## 2024-08-16 RX ORDER — ATENOLOL 100 MG/1
100 TABLET ORAL DAILY
Qty: 90 TABLET | Refills: 0 | Status: SHIPPED | OUTPATIENT
Start: 2024-08-16

## 2024-08-26 NOTE — PROGRESS NOTES
Established Patient - TeleHealth Visit    The patient location is: [place]  The chief complaint leading to consultation is: chronic pain     Visit type: audiovisual    Face to Face time with patient: 10-15 minutes  20 minutes of total time spent on the encounter, which includes face to face time and non-face to face time preparing to see the patient (eg, review of tests), Obtaining and/or reviewing separately obtained history, Documenting clinical information in the electronic or other health record, Independently interpreting results (not separately reported) and communicating results to the patient/family/caregiver, or Care coordination (not separately reported).     Each patient to whom he or she provides medical services by telemedicine is:  (1) informed of the relationship between the physician and patient and the respective role of any other health care provider with respect to management of the patient; and (2) notified that he or she may decline to receive medical services by telemedicine and may withdraw from such care at any time.      Interventional Pain Note     Referring Physician: No ref. provider found    PCP: Nakita Valverde MD    Chief Complaint:   No chief complaint on file.       SUBJECTIVE:  Interval History (9/5/2024):  Patient Ingrid Johnson presents today for follow-up visit.  Patient was last seen on 8/2/2024 bilateral IA knee injection with % relief. Her knee pain today is 1/10. She also has chronic low back pain that radiates down the lateral and posterior aspects of the bilateral lower extremities to the feet. Pain is worse with prolonged standing or walking. She rates her back pain 8/10. She has been taking gabapentin 100mg daily but is unable to increase dosage due to drowsiness.   Patient denies night fever/night sweats, urinary incontinence, bowel incontinence, significant weight loss and significant motor weakness.   Patient denies any other complaints or concerns at  this time.        Interval history 7/17/2024  Ingrid Johnson is a 59 y.o. female with past medical history significant for hypertension, peripheral vascular disease, obesity, obstructive sleep apnea on CPAP who presents to the clinic for the evaluation of lower back and leg pain.  Patient reports pain has been present for several years without inciting accident injury or trauma.  Of note patient is a  at [employer] and believes her symptoms began from prolonged sitting daily.  Today she reports pain which is constant which is rated an 8/10.  Pain at its best is a 5/10 and at its worse is a 10/10.  Pain is described as sharp, stabbing, aching, burning in nature.  Today she reports pain in a bandlike distribution in the lower back which can radiate down the posterior aspects of bilateral lower extremities in L5-S1 distribution to the feet.  Pain can be exacerbated with positional changes, moving from sitting to standing and with walking.  Patient is able to only ambulate a few minutes before requiring rest.  Pain is improved with sitting.  Patient is also taking Indocin with moderate improvement in her pain.  She does not take this medication daily.  She also reports diagnosis of gout, which typically affects her right foot.  Patient has performed physician directed physical therapy exercises at home for lower back, leg and knee pain over the last 8 weeks from 05/17/2024 through 07/17/2024 with marginal improvement in pain, range of motion functionality.  Patient has not tried membrane stabilizing agents or interventional treatment.    Patient reports significant motor weakness.  Patient denies night fever/night sweats, urinary incontinence, bowel incontinence, significant weight loss, and loss of sensations.      Pain Disability Index Review:         7/17/2024     9:25 AM   Last 3 PDI Scores   Pain Disability Index (PDI) 59       Non-Pharmacologic Treatments:  Physical Therapy/Home Exercise:  yes  Ice/Heat:yes  TENS: no  Acupuncture: no  Massage: no  Chiropractic: no    Other: yes; relaxation technique      Pain Medications:  - Adjuvant Medications: Clonazepam (Klonopin) and Cyclobenzaprine (Flexeril)    Pain Procedures:   2024 bilateral IA knee injection with % relief    Past Medical History:   Diagnosis Date    Abnormal EKG 2018    Anxiety disorder, unspecified     Arthritis of lumbar spine     Dizziness 2018    Gout, unspecified     Hypertension     Low back pain     Obesity     Postmenopausal     Sleep apnea     questionable--snores    Snoring 2018    Vitamin D deficiency      Past Surgical History:   Procedure Laterality Date     SECTION, LOW TRANSVERSE      x 4; 1 possible classical c section    COLONOSCOPY N/A 3/8/2017    Procedure: COLONOSCOPY;  Surgeon: Paul Hamlin III, MD;  Location: [facility] ENDO;  Service: Endoscopy;  Laterality: N/A;    HYSTERECTOMY      INJECTION OF JOINT Bilateral 2024    Procedure: Bilateral intraarticular knee injection with local;  Surgeon: Brant Whatley MD;  Location: [facility] PAIN T;  Service: Pain Management;  Laterality: Bilateral;    Left breast lumpectomy (benign, 15yrs old)      OOPHORECTOMY      LSO     Review of patient's allergies indicates:  No Known Allergies    Current Outpatient Medications   Medication Sig    allopurinoL (ZYLOPRIM) 300 MG tablet Take 1 tablet (300 mg total) by mouth once daily.    amLODIPine (NORVASC) 5 MG tablet Take 1 tablet (5 mg total) by mouth once daily.    atenoloL (TENORMIN) 100 MG tablet Take 1 tablet (100 mg total) by mouth once daily.    chlorthalidone (HYGROTEN) 25 MG Tab Take 1 tablet by mouth once daily    cholecalciferol, vitamin D3, 250 mcg (10,000 unit) Tab Take by mouth once a week.    clonazePAM (KLONOPIN) 0.5 MG tablet Take 0.25 mg by mouth daily as needed for Anxiety.    cyclobenzaprine (FLEXERIL) 10 MG tablet Take 10 mg by mouth 3 (three) times daily as needed for Muscle  spasms.    diazePAM (VALIUM) 10 MG Tab Take 1 tablet (10 mg total) by mouth once. for 1 dose    furosemide (LASIX) 20 MG tablet Take 20 mg by mouth daily as needed.    gabapentin (NEURONTIN) 300 MG capsule Take 1 capsule (300 mg total) by mouth every evening for 3 days, THEN 1 capsule (300 mg total) 2 (two) times daily for 3 days, THEN 1 capsule (300 mg total) 3 (three) times daily for 3 days, THEN 2 capsules (600 mg total) 2 (two) times daily for 3 days, THEN 2 capsules (600 mg total) 3 (three) times daily for 18 days.    indomethacin (INDOCIN) 25 MG capsule      No current facility-administered medications for this visit.         ROS:  GENERAL:  No weight loss, malaise or fevers.  HEENT:   No recent changes in vision or hearing  NECK:  Negative for lumps, no difficulty with swallowing.  RESPIRATORY:  Negative for cough, wheezing or shortness of breath, patient denies any recent URI.  CARDIOVASCULAR:  Negative for chest pain or palpitations.  GI:  Negative for abdominal discomfort, blood in stools or black stools or change in bowel habits.  MUSCULOSKELETAL:  See HPI.  SKIN:  Negative for lesions, rash, and itching.  PSYCH:  No mood disorder or recent psychosocial stressors.   HEMATOLOGY/LYMPHOLOGY:  Negative for prolonged bleeding, bruising easily or swollen nodes.    NEURO:   No history of syncope, paralysis, seizures or tremors.  All other reviewed and negative other than HPI.      Telemedicine Exam  There were no vitals filed for this visit.  There is no height or weight on file to calculate BMI.      Physical Exam: last in clinic visit:    OBJECTIVE:    [facility] 06/16/2013       Physical Exam:    GENERAL: Well appearing, in no acute distress, alert and oriented x3.  PSYCH:  Mood and affect appropriate.  SKIN: Skin color, texture, turgor normal, no rashes or lesions.  HEAD/FACE:  Normocephalic, atraumatic. Cranial nerves grossly intact.      CV: RRR with palpation of the radial artery.  PULM: No evidence of  respiratory difficulty, symmetric chest rise.  GI:  Soft and non-tender.    Knee Exam:  bilateral    Erythema: Absent  Deformity/Swelling: Absent  Effusion: Absent  Chronic Bony Changes: Present on R  Creptius: Absent     milddiffuse tenderness palpation of the mediolateral joint lines and patella     ROM: full range of motion     Strength is 5/5 bilateral     Meniscus   Ritu:  Medial - negative Lateral - negative    Stability Lachman: normal PCL-Posterior Drawer: normal  MCL - Valgus: normal  LCL - Varus: normal     Patella   Patellar apprehension: positive  Patellar Tracking: normal     Neurovascular intact        BACK: Straight leg raising in the sitting and supine positions is negative to radicular pain. No pain to palpation over the facet joints of the lumbar spine or spinous processes. Normal range of motion without pain reproduction.  EXTREMITIES: Peripheral joint ROM is full and pain free without obvious instability or laxity in all four extremities. No deformities, edema, or skin discoloration. Good capillary refill.  MUSCULOSKELETAL: Able to stand on heels & toes.   Shoulder, hip, and knee provocative maneuvers are negative.  There is no pain with palpation over the sacroiliac joints bilaterally.  Gaenslen's, Distraction/Compression and  FABERs test is negative.  Facet loading test is negative bilaterally.   Bilateral upper and lower extremity strength is normal and symmetric.  No atrophy or tone abnormalities are noted.    RIGHT Lower extremity: Hip flexion 5/5, Hip Abduction 5/5, Hip Adduction 5/5, Knee extension 5/5, Knee flexion 5/5, Ankle dorsiflexion5/5, Extensor hallucis longus 5/5, Ankle plantarflexion 5/5  LEFT Lower extremity:  Hip flexion 5/5, Hip Abduction 5/5,Hip Adduction 5/5, Knee extension 5/5, Knee flexion 5/5, Ankle dorsiflexion 5/5, Extensor hallucis longus 5/5, Ankle plantarflexion 5/5  -Normal testing knee (patellar) jerk and ankle (achilles) jerk    NEURO: Bilateral upper and  lower extremity coordination and muscle stretch reflexes are physiologic and symmetric. No loss of sensation is noted.  GAIT: normal.    Imagin2024 lumbar MRI  FINDINGS:  The distal cord and conus reveal normal signal and morphology.     Lumbar vertebra are grossly normal in alignment.  A right L3 vertebral body hemangioma is incidentally noted.     T12-L1: Mild hypertrophic facet arthrosis.     L1-2:     Mild hypertrophic facet arthrosis.     L2-3:     Moderate to severe left and moderate right hypertrophic facet arthrosis.     L3-4:     Minor disc desiccation and disc bulge with moderate to severe left and moderate right hypertrophic facet arthrosis.     L4-5:     Mild disc degeneration with disc desiccation and disc bulge.  Severe hypertrophic facet arthrosis with dorsal sac impingement left worse than right.  Mild central canal stenosis.  Moderate foraminal stenosis.     L5-S1:    Moderate to severe right and moderate left facet arthrosis.     Impression:     High-grade facet arthrosis particularly at L3-4, L4-5 and L5-S1.        ASSESSMENT: 59 y.o. year old female with   1. Lumbar spondylosis        2. Lumbar facet arthropathy        3. Primary osteoarthritis of both knees                PLAN:   - Interventions:  Consider L5/S1 IL ARIELA. Pt would like to continue some at home physical therapy for several weeks first  Explained the risks and benefits of the procedure in detail with the patient today in clinic along with alternative treatment options      Repeat IA knee injections as needed if knee pain returns    - Anticoagulation use: No no anticoagulation    -we have discussed considering bilateral L3-5 lumbar medial branch block to address axial back pain versus lumbar transforaminal epidural steroid injection for lumbar radiculopathy.  We will 1st review lumbar x-ray and MRI.     report:  Reviewed and consistent with medication use as prescribed.    - Medications:   -  Continue gabapentin 100mg  QHS. She cannot tolerate higher dosages due to drowsiness. We have reviewed potential side effects of this medication including daytime somnolence, weight gain and peripheral edema        - Therapy:   We discussed initiating physical therapy to help manage the patient/s painful condition. The patient was counseled that muscle strengthening will improve the long term prognosis in regards to pain and may also help increase range of motion and mobility. They were told that one of the goals of physical therapy is that they learn how to do the exercises so that they can do them independently at home daily upon completion. The patient's questions were answered and they were agreeable to this course. Patient would like to continue her at home physician guided physical therapy exercises    - Imaging:  MRI lumbar spine reviewed with patient. Findings most prevalent at L4/5 and L5/S1      - Follow up visit: return to clinic in 6-8 weeks    Visit today included increased complexity associated with the care of the episodic problem of chronic pain which was addressed and continue to manage the longitudinal care of the patient due to the serious and/or complex managed problem(s) listed above.      The above plan and management options were discussed at length with patient. Patient is in agreement with the above and verbalized understanding.    - I discussed the goals of interventional chronic pain management with the patient on today's visit. We discussed a multimodal and systematic approach to pain.  This includes diagnostic and therapeutic injections, adjuvant pharmacologic treatment, physical therapy, and at times psychiatry.  I emphasized the importance of regular exercise, core strengthening and stretching, diet and weight loss as a cornerstone of long-term pain management.    - This condition does not require this patient to take time off of work, and the primary goal of our Pain Management services is to improve the  patient's functional capacity.  - Patient Questions: Answered all of the patient's questions regarding diagnoses, therapy, treatment and next steps        Nara Robles NP  Interventional Pain Management  [facility]    Disclaimer:  This note was prepared using voice recognition system and is likely to have sound alike errors that may have been overlooked even after proof reading.  Please call me with any questions

## 2024-09-04 ENCOUNTER — TELEPHONE (OUTPATIENT)
Dept: PAIN MEDICINE | Facility: CLINIC | Age: 59
End: 2024-09-04
Payer: COMMERCIAL

## 2024-09-05 ENCOUNTER — TELEPHONE (OUTPATIENT)
Dept: PAIN MEDICINE | Facility: CLINIC | Age: 59
End: 2024-09-05
Payer: COMMERCIAL

## 2024-09-05 ENCOUNTER — OFFICE VISIT (OUTPATIENT)
Dept: PAIN MEDICINE | Facility: CLINIC | Age: 59
End: 2024-09-05
Payer: COMMERCIAL

## 2024-09-05 DIAGNOSIS — M47.816 LUMBAR FACET ARTHROPATHY: ICD-10-CM

## 2024-09-05 DIAGNOSIS — M17.0 PRIMARY OSTEOARTHRITIS OF BOTH KNEES: ICD-10-CM

## 2024-09-05 DIAGNOSIS — M47.816 LUMBAR SPONDYLOSIS: Primary | ICD-10-CM

## 2024-09-05 PROCEDURE — 99213 OFFICE O/P EST LOW 20 MIN: CPT | Mod: 95,,, | Performed by: NURSE PRACTITIONER

## 2024-09-05 NOTE — TELEPHONE ENCOUNTER
Called patient and scheduled a 6 wk f/u per Nara wolfe. Pt verbalized understanding.    J Luis VAUGHAN

## 2024-09-16 ENCOUNTER — HOSPITAL ENCOUNTER (OUTPATIENT)
Dept: CARDIOLOGY | Facility: HOSPITAL | Age: 59
Discharge: HOME | End: 2024-09-16
Attending: INTERNAL MEDICINE
Payer: COMMERCIAL

## 2024-09-16 VITALS
HEIGHT: 60 IN | DIASTOLIC BLOOD PRESSURE: 93 MMHG | SYSTOLIC BLOOD PRESSURE: 163 MMHG | BODY MASS INDEX: 55.36 KG/M2 | WEIGHT: 282 LBS

## 2024-09-16 DIAGNOSIS — R06.02 SOB (SHORTNESS OF BREATH): ICD-10-CM

## 2024-09-16 LAB
AORTIC ROOT ANNULUS: 3.34 CM
ASCENDING AORTA: 3.5 CM
AV INDEX (PROSTH): 0.74
AV MEAN GRADIENT: 4 MMHG
AV PEAK GRADIENT: 7 MMHG
AV VALVE AREA BY VELOCITY RATIO: 2.59 CM²
AV VALVE AREA: 2.47 CM²
AV VELOCITY RATIO: 0.78
BSA FOR ECHO PROCEDURE: 2.33 M2
CV ECHO LV RWT: 0.54 CM
DOP CALC AO PEAK VEL: 1.34 M/S
DOP CALC AO VTI: 34.3 CM
DOP CALC LVOT AREA: 3.3 CM2
DOP CALC LVOT DIAMETER: 2.06 CM
DOP CALC LVOT PEAK VEL: 1.04 M/S
DOP CALC LVOT STROKE VOLUME: 84.61 CM3
DOP CALC RVOT PEAK VEL: 0.71 M/S
DOP CALC RVOT VTI: 19.3 CM
DOP CALCLVOT PEAK VEL VTI: 25.4 CM
E WAVE DECELERATION TIME: 246.49 MSEC
E/A RATIO: 0.94
E/E' RATIO: 10.25 M/S
ECHO LV POSTERIOR WALL: 1.24 CM (ref 0.6–1.1)
FRACTIONAL SHORTENING: 32 % (ref 28–44)
INTERVENTRICULAR SEPTUM: 1.54 CM (ref 0.6–1.1)
IVC DIAMETER: 1.69 CM
IVRT: 98.95 MSEC
LA MAJOR: 5.82 CM
LA MINOR: 5.54 CM
LA WIDTH: 3.9 CM
LEFT ATRIUM AREA SYSTOLIC (APICAL 2 CHAMBER): 19.36 CM2
LEFT ATRIUM AREA SYSTOLIC (APICAL 4 CHAMBER): 18.07 CM2
LEFT ATRIUM SIZE: 3.49 CM
LEFT ATRIUM VOLUME INDEX MOD: 24.9 ML/M2
LEFT ATRIUM VOLUME INDEX: 30.4 ML/M2
LEFT ATRIUM VOLUME MOD: 53.75 CM3
LEFT ATRIUM VOLUME: 65.67 CM3
LEFT INTERNAL DIMENSION IN SYSTOLE: 3.16 CM (ref 2.1–4)
LEFT VENTRICLE DIASTOLIC VOLUME INDEX: 45.66 ML/M2
LEFT VENTRICLE DIASTOLIC VOLUME: 98.63 ML
LEFT VENTRICLE END SYSTOLIC VOLUME APICAL 2 CHAMBER: 58.39 ML
LEFT VENTRICLE END SYSTOLIC VOLUME APICAL 4 CHAMBER: 47.77 ML
LEFT VENTRICLE MASS INDEX: 119 G/M2
LEFT VENTRICLE SYSTOLIC VOLUME INDEX: 18.3 ML/M2
LEFT VENTRICLE SYSTOLIC VOLUME: 39.62 ML
LEFT VENTRICULAR INTERNAL DIMENSION IN DIASTOLE: 4.63 CM (ref 3.5–6)
LEFT VENTRICULAR MASS: 256.54 G
LV LATERAL E/E' RATIO: 10.25 M/S
LV SEPTAL E/E' RATIO: 10.25 M/S
LVED V (TEICH): 98.63 ML
LVES V (TEICH): 39.62 ML
LVOT MG: 2.67 MMHG
LVOT MV: 0.78 CM/S
MV PEAK A VEL: 0.87 M/S
MV PEAK E VEL: 0.82 M/S
MV STENOSIS PRESSURE HALF TIME: 71.48 MS
MV VALVE AREA P 1/2 METHOD: 3.08 CM2
OHS CV RV/LV RATIO: 0.8 CM
PULM VEIN S/D RATIO: 2.04
PV MEAN GRADIENT: 1 MMHG
PV MV: 0.64 M/S
PV PEAK D VEL: 0.25 M/S
PV PEAK GRADIENT: 3 MMHG
PV PEAK S VEL: 0.51 M/S
PV PEAK VELOCITY: 0.83 M/S
RA MAJOR: 5.12 CM
RA PRESSURE ESTIMATED: 3 MMHG
RA WIDTH: 2.79 CM
RIGHT VENTRICULAR END-DIASTOLIC DIMENSION: 3.72 CM
SINUS: 3.28 CM
STJ: 3.34 CM
TDI LATERAL: 0.08 M/S
TDI SEPTAL: 0.08 M/S
TDI: 0.08 M/S
TRICUSPID ANNULAR PLANE SYSTOLIC EXCURSION: 2.43 CM
Z-SCORE OF LEFT VENTRICULAR DIMENSION IN END DIASTOLE: -4.24
Z-SCORE OF LEFT VENTRICULAR DIMENSION IN END SYSTOLE: -2.45

## 2024-09-16 PROCEDURE — 93306 TTE W/DOPPLER COMPLETE: CPT

## 2024-09-16 PROCEDURE — 93306 TTE W/DOPPLER COMPLETE: CPT | Mod: 26,,, | Performed by: INTERNAL MEDICINE

## 2024-09-17 ENCOUNTER — TELEPHONE (OUTPATIENT)
Dept: CARDIOLOGY | Facility: CLINIC | Age: 59
End: 2024-09-17
Payer: COMMERCIAL

## 2024-09-17 NOTE — TELEPHONE ENCOUNTER
Contacted PT with results if Echo, PT informed to continue current RX and F/U as scheduled. PT stated verbalunderstanding        ----- Message from Michele Sue sent at 9/17/2024  4:13 PM CDT -----  Contact: lian  Type:  Patient Returning Call    Who Called:lian  Who Left Message for Patient:nurse  Does the patient know what this is regarding?:missed call  Would the patient rather a call back or a response via [facility]? call  Best Call Back Number:[phone]   Additional Information:

## 2024-09-17 NOTE — TELEPHONE ENCOUNTER
Lvm for pt to call back in regards test results                     ----- Message from Mohsen England MD sent at 9/16/2024  7:48 PM CDT -----  The Echo showed normal function and mild valvular leaking and mild large heart  Continue current Rx.   F/U as scheduled

## 2024-10-04 ENCOUNTER — OFFICE VISIT (OUTPATIENT)
Dept: OPHTHALMOLOGY | Facility: CLINIC | Age: 59
End: 2024-10-04
Payer: COMMERCIAL

## 2024-10-04 DIAGNOSIS — H25.13 NUCLEAR SCLEROSIS OF BOTH EYES: Primary | ICD-10-CM

## 2024-10-04 DIAGNOSIS — H40.013 OPEN ANGLE WITH BORDERLINE FINDINGS AND LOW GLAUCOMA RISK IN BOTH EYES: ICD-10-CM

## 2024-10-04 PROCEDURE — 99999 PR PBB SHADOW E&M-EST. PATIENT-LVL III: CPT | Mod: PBBFAC,,, | Performed by: OPHTHALMOLOGY

## 2024-10-04 NOTE — PROGRESS NOTES
HPI     Annual Exam     Additional comments: Pt co va ou getting worse overall  Pt last exam was before covid  Pt in front of a computer all day   Pt co watering   No gtt usage   Exams with BRIAN off [facility] in the past          Last edited by Pranav Liu on 10/4/2024  9:59 AM.            Assessment /Plan     For exam results, see Encounter Report.    Nuclear sclerosis of both eyes  Cataracts are present but not visually significant. Will continue to monitor. Mrx provided.    Open angle, borderline findings, low risk both eyes  Positive family history. No evidence of glaucoma at this time but based on risk factors recommend to continue monitoring.        RTC 1 year, sooner prn

## 2024-10-14 RX ORDER — CHLORTHALIDONE 25 MG/1
25 TABLET ORAL DAILY
Qty: 30 TABLET | Refills: 5 | Status: SHIPPED | OUTPATIENT
Start: 2024-10-14

## 2025-01-31 ENCOUNTER — LAB VISIT (OUTPATIENT)
Dept: LAB | Facility: HOSPITAL | Age: 60
End: 2025-01-31
Payer: COMMERCIAL

## 2025-01-31 ENCOUNTER — OFFICE VISIT (OUTPATIENT)
Dept: FAMILY MEDICINE | Facility: CLINIC | Age: 60
End: 2025-01-31
Attending: FAMILY MEDICINE
Payer: COMMERCIAL

## 2025-01-31 VITALS
BODY MASS INDEX: 55.66 KG/M2 | SYSTOLIC BLOOD PRESSURE: 140 MMHG | HEIGHT: 60 IN | OXYGEN SATURATION: 97 % | HEART RATE: 57 BPM | TEMPERATURE: 98 F | DIASTOLIC BLOOD PRESSURE: 86 MMHG | WEIGHT: 283.5 LBS

## 2025-01-31 DIAGNOSIS — I10 PRIMARY HYPERTENSION: ICD-10-CM

## 2025-01-31 DIAGNOSIS — E66.01 CLASS 3 SEVERE OBESITY DUE TO EXCESS CALORIES WITHOUT SERIOUS COMORBIDITY WITH BODY MASS INDEX (BMI) OF 50.0 TO 59.9 IN ADULT: ICD-10-CM

## 2025-01-31 DIAGNOSIS — E66.813 CLASS 3 SEVERE OBESITY DUE TO EXCESS CALORIES WITHOUT SERIOUS COMORBIDITY WITH BODY MASS INDEX (BMI) OF 50.0 TO 59.9 IN ADULT: ICD-10-CM

## 2025-01-31 DIAGNOSIS — Z00.00 PREVENTATIVE HEALTH CARE: ICD-10-CM

## 2025-01-31 DIAGNOSIS — M47.816 ARTHRITIS, LUMBAR SPINE: ICD-10-CM

## 2025-01-31 DIAGNOSIS — Z11.59 NEED FOR HEPATITIS C SCREENING TEST: ICD-10-CM

## 2025-01-31 DIAGNOSIS — G47.33 OSA (OBSTRUCTIVE SLEEP APNEA): ICD-10-CM

## 2025-01-31 DIAGNOSIS — Z00.00 PREVENTATIVE HEALTH CARE: Primary | ICD-10-CM

## 2025-01-31 DIAGNOSIS — H61.21 IMPACTED CERUMEN OF RIGHT EAR: ICD-10-CM

## 2025-01-31 DIAGNOSIS — E55.9 VITAMIN D DEFICIENCY: ICD-10-CM

## 2025-01-31 DIAGNOSIS — M10.9 GOUT, UNSPECIFIED CAUSE, UNSPECIFIED CHRONICITY, UNSPECIFIED SITE: ICD-10-CM

## 2025-01-31 DIAGNOSIS — F41.9 ANXIETY: ICD-10-CM

## 2025-01-31 DIAGNOSIS — B36.9 FUNGAL SKIN INFECTION: ICD-10-CM

## 2025-01-31 DIAGNOSIS — Z23 NEED FOR VACCINATION: ICD-10-CM

## 2025-01-31 PROCEDURE — 3077F SYST BP >= 140 MM HG: CPT | Mod: CPTII,S$GLB,,

## 2025-01-31 PROCEDURE — 80061 LIPID PANEL: CPT

## 2025-01-31 PROCEDURE — 85025 COMPLETE CBC W/AUTO DIFF WBC: CPT

## 2025-01-31 PROCEDURE — 3079F DIAST BP 80-89 MM HG: CPT | Mod: CPTII,S$GLB,,

## 2025-01-31 PROCEDURE — 3008F BODY MASS INDEX DOCD: CPT | Mod: CPTII,S$GLB,,

## 2025-01-31 PROCEDURE — 99999 PR PBB SHADOW E&M-EST. PATIENT-LVL IV: CPT | Mod: PBBFAC,,,

## 2025-01-31 PROCEDURE — 99396 PREV VISIT EST AGE 40-64: CPT | Mod: 25,S$GLB,,

## 2025-01-31 PROCEDURE — 84443 ASSAY THYROID STIM HORMONE: CPT

## 2025-01-31 PROCEDURE — 1159F MED LIST DOCD IN RCRD: CPT | Mod: CPTII,S$GLB,,

## 2025-01-31 PROCEDURE — 90471 IMMUNIZATION ADMIN: CPT | Mod: S$GLB,,,

## 2025-01-31 PROCEDURE — 90656 IIV3 VACC NO PRSV 0.5 ML IM: CPT | Mod: S$GLB,,,

## 2025-01-31 PROCEDURE — 80053 COMPREHEN METABOLIC PANEL: CPT

## 2025-01-31 PROCEDURE — 83036 HEMOGLOBIN GLYCOSYLATED A1C: CPT

## 2025-01-31 PROCEDURE — 86803 HEPATITIS C AB TEST: CPT

## 2025-01-31 RX ORDER — NYSTATIN AND TRIAMCINOLONE ACETONIDE 100000; 1 [USP'U]/G; MG/G
CREAM TOPICAL 2 TIMES DAILY
Qty: 15 G | Refills: 1 | Status: SHIPPED | OUTPATIENT
Start: 2025-01-31

## 2025-01-31 RX ORDER — CLONAZEPAM 0.5 MG/1
0.25 TABLET ORAL DAILY PRN
Qty: 30 TABLET | Refills: 0 | Status: SHIPPED | OUTPATIENT
Start: 2025-01-31

## 2025-01-31 RX ORDER — VIT C/E/ZN/COPPR/LUTEIN/ZEAXAN 250MG-90MG
1 CAPSULE ORAL WEEKLY
Qty: 4 TABLET | Refills: 11 | Status: SHIPPED | OUTPATIENT
Start: 2025-01-31

## 2025-01-31 NOTE — PROGRESS NOTES
Subjective:      Patient ID: Ingrid Johnson is a 59 y.o. female.    Chief Complaint: Annual Exam    Mrs. Johnson presents to clinic today for annual visit including lab work and refill on medications. She is not fasting today as she reports she had a muffin for breakfast. Reports that she is UTD with health maintenance as she had colonoscopy in 2017 (every 10 years), Mammogram 2024 (annually) and eye exam in 2024 (annually). States she is making attempts to incorporate low fat, low sugar, low carb diet.  She does not participate in routine physical activity and works a remote job at home. Major complaint today is itching and irritation to groin and vulva area for the past week. Denies new hygiene products but admits that she does often sweat to these areas. She also reports decreased hearing and discomfort to her ears. States that she has history of impacted wax requiring referral to ENT for removal. Reports that although she may have bowel movements daily, she still has problems with constipation. States BM are small and hard causing her to strain when attempting to pass stool. While in office, patient request to have flu vaccine.         Review of Systems   Constitutional:  Negative for fatigue and fever.   HENT:  Positive for ear pain, hearing loss and sore throat. Negative for nasal congestion and postnasal drip.    Eyes:  Positive for visual disturbance.   Respiratory:  Negative for shortness of breath.    Cardiovascular:  Negative for chest pain.   Gastrointestinal:  Positive for constipation (strain with bowel movements). Negative for abdominal pain, diarrhea, nausea and vomiting.   Genitourinary:  Negative for dysuria, frequency, hematuria and vaginal discharge.        Admits to vaginal itching   Musculoskeletal:  Positive for arthralgias.   Integumentary:  Negative for rash and wound.   Neurological:  Negative for dizziness, weakness and headaches.   Psychiatric/Behavioral:  Negative for depressed  mood and sleep disturbance. The patient is nervous/anxious.         Current Outpatient Medications   Medication Sig    allopurinoL (ZYLOPRIM) 300 MG tablet Take 1 tablet (300 mg total) by mouth once daily.    amLODIPine (NORVASC) 5 MG tablet Take 1 tablet (5 mg total) by mouth once daily.    atenoloL (TENORMIN) 100 MG tablet Take 1 tablet (100 mg total) by mouth once daily.    chlorthalidone (HYGROTEN) 25 MG Tab Take 1 tablet (25 mg total) by mouth once daily.    cyclobenzaprine (FLEXERIL) 10 MG tablet Take 10 mg by mouth 3 (three) times daily as needed for Muscle spasms.    diazePAM (VALIUM) 10 MG Tab Take 1 tablet (10 mg total) by mouth once. for 1 dose    furosemide (LASIX) 20 MG tablet Take 20 mg by mouth daily as needed.    gabapentin (NEURONTIN) 300 MG capsule Take 1 capsule (300 mg total) by mouth every evening for 3 days, THEN 1 capsule (300 mg total) 2 (two) times daily for 3 days, THEN 1 capsule (300 mg total) 3 (three) times daily for 3 days, THEN 2 capsules (600 mg total) 2 (two) times daily for 3 days, THEN 2 capsules (600 mg total) 3 (three) times daily for 18 days.    indomethacin (INDOCIN) 25 MG capsule     carbamide peroxide (DEBROX) 6.5 % otic solution Place 5 drops into the right ear 2 (two) times daily.    cholecalciferol, vitamin D3, 250 mcg (10,000 unit) Tab Take 1 tablet by mouth once a week.    clonazePAM (KLONOPIN) 0.5 MG tablet Take 0.5 tablets (0.25 mg total) by mouth daily as needed for Anxiety.    nystatin-triamcinolone (MYCOLOG II) cream Apply topically 2 (two) times daily.     No current facility-administered medications for this visit.       Objective:   BP (!) 140/86   Pulse (!) 57   Temp 98.2 °F (36.8 °C) (Tympanic)   Ht 5' (1.524 m)   Wt 128.6 kg (283 lb 8.2 oz)   LMP 06/16/2013   SpO2 97%   BMI 55.37 kg/m²     Physical Exam  Vitals reviewed.   Constitutional:       Appearance: Normal appearance. She is obese.   HENT:      Head: Normocephalic and atraumatic.      Right Ear:  Ear canal and external ear normal. There is impacted cerumen.      Left Ear: Tympanic membrane, ear canal and external ear normal.      Nose: Nose normal.      Mouth/Throat:      Mouth: Mucous membranes are moist.      Pharynx: Oropharynx is clear.   Eyes:      Extraocular Movements: Extraocular movements intact.      Conjunctiva/sclera: Conjunctivae normal.      Pupils: Pupils are equal, round, and reactive to light.   Cardiovascular:      Rate and Rhythm: Normal rate and regular rhythm.      Pulses: Normal pulses.      Heart sounds: Normal heart sounds.   Pulmonary:      Effort: Pulmonary effort is normal.      Breath sounds: Normal breath sounds.   Abdominal:      General: Bowel sounds are normal.      Palpations: Abdomen is soft.   Genitourinary:     Comments: deferred  Musculoskeletal:         General: Normal range of motion.      Cervical back: Normal range of motion and neck supple.   Skin:     General: Skin is warm and dry.      Capillary Refill: Capillary refill takes less than 2 seconds.   Neurological:      General: No focal deficit present.      Mental Status: She is alert and oriented to person, place, and time.   Psychiatric:         Mood and Affect: Mood normal.         Behavior: Behavior normal.         Thought Content: Thought content normal.         Judgment: Judgment normal.         Assessment:       ICD-10-CM ICD-9-CM    1. Preventative health care  Z00.00 V70.0 Hepatitis C Antibody      CBC Auto Differential      Comprehensive Metabolic Panel      Lipid Panel      Hemoglobin A1C      TSH       influenza  vaccine (> or = 17 yo) 0.5 mL      2. Primary hypertension  I10 401.9 Stable. Continue amlodipine and atenolol      3. Gout, unspecified cause, unspecified chronicity, unspecified site  M10.9 274.9 Stable. Continue allopurinol       4. Need for hepatitis C screening test  Z11.59 V73.89 Hepatitis C Antibody      5. Fungal skin infection  B36.9 111.9 nystatin-triamcinolone (MYCOLOG II) cream       6. Impacted cerumen of right ear  H61.21 380.4 carbamide peroxide (DEBROX) 6.5 % otic solution. F/u with ENT if symptoms continue      7. Need for vaccination  Z23 V05.9 Influenza - Trivalent - PF (ADULT)      8. Vitamin D deficiency  E55.9 268.9 cholecalciferol, vitamin D3, 250 mcg (10,000 unit) Tab (stable)      9. Anxiety  F41.9 300.00 clonazePAM (KLONOPIN) 0.5 MG tablet (stable)      10. Arthritis, lumbar spine  M47.816 721.3       11. Class 3 severe obesity due to excess calories without serious comorbidity with body mass index (BMI) of 50.0 to 59.9 in adult  E66.813 278.01 Healthy lifestyle and diet habits discussed and encouraged    Z68.43 V85.43     E66.01        12. MARK (obstructive sleep apnea)  G47.33 327.23 Use of cpap. stable          Plan:       1. Age-appropriate counseling-appropriate low-sodium, low-cholesterol, low carbohydrate diet and exercise daily, annual wellness examination.   2. Labs today. Patient advised to call for results.  3. Continue current medications.  4. Keep follow up with specialists.  5. Follow up in about 6 months (around 7/31/2025) for follow up.       I spent a total of 35 minutes on the day of the visit.  This includes face to face time and non-face to face time preparing to see the patient (eg, review of tests), obtaining and/or reviewing separately obtained history, documenting clinical information in the electronic or other health record, independently interpreting results and communicating results to the patient/family/caregiver, or care coordinator.        DEBBIE Wright

## 2025-02-01 LAB
ALBUMIN SERPL BCP-MCNC: 3.6 G/DL (ref 3.5–5.2)
ALP SERPL-CCNC: 104 U/L (ref 40–150)
ALT SERPL W/O P-5'-P-CCNC: 9 U/L (ref 10–44)
ANION GAP SERPL CALC-SCNC: 10 MMOL/L (ref 8–16)
AST SERPL-CCNC: 17 U/L (ref 10–40)
BASOPHILS # BLD AUTO: 0.07 K/UL (ref 0–0.2)
BASOPHILS NFR BLD: 1 % (ref 0–1.9)
BILIRUB SERPL-MCNC: 0.4 MG/DL (ref 0.1–1)
BUN SERPL-MCNC: 20 MG/DL (ref 6–20)
CALCIUM SERPL-MCNC: 9.8 MG/DL (ref 8.7–10.5)
CHLORIDE SERPL-SCNC: 101 MMOL/L (ref 95–110)
CHOLEST SERPL-MCNC: 205 MG/DL (ref 120–199)
CHOLEST/HDLC SERPL: 3.6 {RATIO} (ref 2–5)
CO2 SERPL-SCNC: 28 MMOL/L (ref 23–29)
CREAT SERPL-MCNC: 1.1 MG/DL (ref 0.5–1.4)
DIFFERENTIAL METHOD BLD: ABNORMAL
EOSINOPHIL # BLD AUTO: 0.4 K/UL (ref 0–0.5)
EOSINOPHIL NFR BLD: 5.9 % (ref 0–8)
ERYTHROCYTE [DISTWIDTH] IN BLOOD BY AUTOMATED COUNT: 17.8 % (ref 11.5–14.5)
EST. GFR  (NO RACE VARIABLE): 57.5 ML/MIN/1.73 M^2
ESTIMATED AVG GLUCOSE: 126 MG/DL (ref 68–131)
GLUCOSE SERPL-MCNC: 79 MG/DL (ref 70–110)
HBA1C MFR BLD: 6 % (ref 4–5.6)
HCT VFR BLD AUTO: 47.4 % (ref 37–48.5)
HCV AB SERPL QL IA: NORMAL
HDLC SERPL-MCNC: 57 MG/DL (ref 40–75)
HDLC SERPL: 27.8 % (ref 20–50)
HGB BLD-MCNC: 14.3 G/DL (ref 12–16)
IMM GRANULOCYTES # BLD AUTO: 0.02 K/UL (ref 0–0.04)
IMM GRANULOCYTES NFR BLD AUTO: 0.3 % (ref 0–0.5)
LDLC SERPL CALC-MCNC: 133.6 MG/DL (ref 63–159)
LYMPHOCYTES # BLD AUTO: 2 K/UL (ref 1–4.8)
LYMPHOCYTES NFR BLD: 28.2 % (ref 18–48)
MCH RBC QN AUTO: 25.3 PG (ref 27–31)
MCHC RBC AUTO-ENTMCNC: 30.2 G/DL (ref 32–36)
MCV RBC AUTO: 84 FL (ref 82–98)
MONOCYTES # BLD AUTO: 0.6 K/UL (ref 0.3–1)
MONOCYTES NFR BLD: 8.6 % (ref 4–15)
NEUTROPHILS # BLD AUTO: 3.9 K/UL (ref 1.8–7.7)
NEUTROPHILS NFR BLD: 56 % (ref 38–73)
NONHDLC SERPL-MCNC: 148 MG/DL
NRBC BLD-RTO: 0 /100 WBC
PLATELET # BLD AUTO: 222 K/UL (ref 150–450)
PMV BLD AUTO: 12.9 FL (ref 9.2–12.9)
POTASSIUM SERPL-SCNC: 5 MMOL/L (ref 3.5–5.1)
PROT SERPL-MCNC: 7.8 G/DL (ref 6–8.4)
RBC # BLD AUTO: 5.65 M/UL (ref 4–5.4)
SODIUM SERPL-SCNC: 139 MMOL/L (ref 136–145)
TRIGL SERPL-MCNC: 72 MG/DL (ref 30–150)
TSH SERPL DL<=0.005 MIU/L-ACNC: 1.97 UIU/ML (ref 0.4–4)
WBC # BLD AUTO: 6.96 K/UL (ref 3.9–12.7)

## 2025-02-04 DIAGNOSIS — I10 HYPERTENSION, UNSPECIFIED TYPE: ICD-10-CM

## 2025-02-04 RX ORDER — ATENOLOL 100 MG/1
100 TABLET ORAL
Qty: 90 TABLET | Refills: 1 | Status: SHIPPED | OUTPATIENT
Start: 2025-02-04

## 2025-02-04 NOTE — TELEPHONE ENCOUNTER
Care Due:                  Date            Visit Type   Department     Provider  --------------------------------------------------------------------------------                                NEW PATIENT                              - OPEN       [facility]  Last Visit: 03-      [facility], [facility]–1983   MEDICINE       Nakita Valverde  Next Visit: None Scheduled  None         None Found                                                            Last  Test          Frequency    Reason                     Performed    Due Date  --------------------------------------------------------------------------------    Uric Acid...  12 months..  allopurinoL..............  03- 03-    [facility] Embedded Care Due Messages. Reference number: [id number].   2/04/2025 9:57:07 AM CST

## 2025-02-04 NOTE — TELEPHONE ENCOUNTER
Refill Routing Note   Medication(s) are not appropriate for processing by [facility] for the following reason(s):        Required vitals abnormal    [facility] action(s):  Defer   Requires labs : Yes             Appointments  past 12m or future 3m with PCP    Date Provider   Last Visit   3/12/2024 Nakita Valverde MD   Next Visit   Visit date not found Nakita Valverde MD   ED visits in past 90 days: 0        Note composed:12:27 PM 02/04/2025

## 2025-02-25 DIAGNOSIS — E55.9 VITAMIN D DEFICIENCY: ICD-10-CM

## 2025-02-25 DIAGNOSIS — H61.21 IMPACTED CERUMEN OF RIGHT EAR: ICD-10-CM

## 2025-02-25 RX ORDER — VIT C/E/ZN/COPPR/LUTEIN/ZEAXAN 250MG-90MG
1 CAPSULE ORAL WEEKLY
Qty: 4 TABLET | Refills: 11 | Status: SHIPPED | OUTPATIENT
Start: 2025-02-25

## 2025-03-06 ENCOUNTER — PATIENT MESSAGE (OUTPATIENT)
Dept: FAMILY MEDICINE | Facility: CLINIC | Age: 60
End: 2025-03-06
Payer: COMMERCIAL

## 2025-03-06 ENCOUNTER — PATIENT MESSAGE (OUTPATIENT)
Dept: ADMINISTRATIVE | Facility: HOSPITAL | Age: 60
End: 2025-03-06
Payer: COMMERCIAL

## 2025-03-06 NOTE — TELEPHONE ENCOUNTER
Lipid abnormalities are improving with treatment    Plan:  Continue same medication/s without change.      Discussed medication dosage, use, side effects, and goals of treatment in detail.    Counseled patient on lifestyle modifications to help control hyperlipidemia.     Patient Treatment Goals:   LDL goal is under 100    Followup at the next regular appointment.   ----- Message from Candi Gee sent at 1/5/2018 11:54 AM CST -----  Contact: pt   Pt needs call pt back to see if she can get the appts switched to the 18th ,,, please call pt back at [phone]

## 2025-05-19 DIAGNOSIS — E55.9 VITAMIN D DEFICIENCY: ICD-10-CM

## 2025-05-19 RX ORDER — VIT C/E/ZN/COPPR/LUTEIN/ZEAXAN 250MG-90MG
1 CAPSULE ORAL WEEKLY
Qty: 4 TABLET | Refills: 11 | Status: SHIPPED | OUTPATIENT
Start: 2025-05-19

## 2025-07-28 ENCOUNTER — PATIENT OUTREACH (OUTPATIENT)
Dept: ADMINISTRATIVE | Facility: HOSPITAL | Age: 60
End: 2025-07-28
Payer: COMMERCIAL

## 2025-07-28 NOTE — PROGRESS NOTES
BR HTN Report - patient stated that she is at work right now. Patient agreed to send a reading through the portal this evening.    PM sent to patient

## 2025-07-29 ENCOUNTER — PATIENT MESSAGE (OUTPATIENT)
Dept: ADMINISTRATIVE | Facility: HOSPITAL | Age: 60
End: 2025-07-29
Payer: COMMERCIAL

## 2025-07-29 VITALS — DIASTOLIC BLOOD PRESSURE: 96 MMHG | SYSTOLIC BLOOD PRESSURE: 110 MMHG

## 2025-08-08 ENCOUNTER — PATIENT OUTREACH (OUTPATIENT)
Dept: ADMINISTRATIVE | Facility: HOSPITAL | Age: 60
End: 2025-08-08
Payer: COMMERCIAL

## 2025-09-02 ENCOUNTER — OFFICE VISIT (OUTPATIENT)
Dept: FAMILY MEDICINE | Facility: CLINIC | Age: 60
End: 2025-09-02
Payer: COMMERCIAL

## 2025-09-02 ENCOUNTER — LAB VISIT (OUTPATIENT)
Dept: LAB | Facility: HOSPITAL | Age: 60
End: 2025-09-02
Attending: FAMILY MEDICINE
Payer: OTHER GOVERNMENT

## 2025-09-02 VITALS
HEIGHT: 60 IN | OXYGEN SATURATION: 96 % | BODY MASS INDEX: 56.14 KG/M2 | SYSTOLIC BLOOD PRESSURE: 144 MMHG | TEMPERATURE: 98 F | HEART RATE: 58 BPM | DIASTOLIC BLOOD PRESSURE: 96 MMHG | WEIGHT: 285.94 LBS

## 2025-09-02 DIAGNOSIS — M47.816 ARTHRITIS, LUMBAR SPINE: ICD-10-CM

## 2025-09-02 DIAGNOSIS — R73.03 PREDIABETES: ICD-10-CM

## 2025-09-02 DIAGNOSIS — N18.2 STAGE 2 CHRONIC KIDNEY DISEASE: ICD-10-CM

## 2025-09-02 DIAGNOSIS — N18.31 STAGE 3A CHRONIC KIDNEY DISEASE: ICD-10-CM

## 2025-09-02 DIAGNOSIS — E66.813 CLASS 3 SEVERE OBESITY DUE TO EXCESS CALORIES WITH BODY MASS INDEX (BMI) OF 50.0 TO 59.9 IN ADULT: ICD-10-CM

## 2025-09-02 DIAGNOSIS — E55.9 VITAMIN D DEFICIENCY: ICD-10-CM

## 2025-09-02 DIAGNOSIS — I10 PRIMARY HYPERTENSION: Primary | ICD-10-CM

## 2025-09-02 LAB
ANION GAP (OHS): 10 MMOL/L (ref 8–16)
BUN SERPL-MCNC: 22 MG/DL (ref 8–23)
CALCIUM SERPL-MCNC: 9.5 MG/DL (ref 8.7–10.5)
CHLORIDE SERPL-SCNC: 102 MMOL/L (ref 95–110)
CO2 SERPL-SCNC: 27 MMOL/L (ref 23–29)
CREAT SERPL-MCNC: 1.1 MG/DL (ref 0.5–1.4)
EAG (OHS): 128 MG/DL (ref 68–131)
GFR SERPLBLD CREATININE-BSD FMLA CKD-EPI: 57 ML/MIN/1.73/M2
GLUCOSE SERPL-MCNC: 97 MG/DL (ref 70–110)
HBA1C MFR BLD: 6.1 % (ref 4–5.6)
POTASSIUM SERPL-SCNC: 4.6 MMOL/L (ref 3.5–5.1)
SODIUM SERPL-SCNC: 139 MMOL/L (ref 136–145)

## 2025-09-02 PROCEDURE — 3080F DIAST BP >= 90 MM HG: CPT | Mod: CPTII,S$GLB,,

## 2025-09-02 PROCEDURE — 99999 PR PBB SHADOW E&M-EST. PATIENT-LVL V: CPT | Mod: PBBFAC,,,

## 2025-09-02 PROCEDURE — 3044F HG A1C LEVEL LT 7.0%: CPT | Mod: CPTII,S$GLB,,

## 2025-09-02 PROCEDURE — 80048 BASIC METABOLIC PNL TOTAL CA: CPT

## 2025-09-02 PROCEDURE — 3077F SYST BP >= 140 MM HG: CPT | Mod: CPTII,S$GLB,,

## 2025-09-02 PROCEDURE — 96372 THER/PROPH/DIAG INJ SC/IM: CPT | Mod: S$GLB,,,

## 2025-09-02 PROCEDURE — 36415 COLL VENOUS BLD VENIPUNCTURE: CPT | Mod: PO

## 2025-09-02 PROCEDURE — 3008F BODY MASS INDEX DOCD: CPT | Mod: CPTII,S$GLB,,

## 2025-09-02 PROCEDURE — 1159F MED LIST DOCD IN RCRD: CPT | Mod: CPTII,S$GLB,,

## 2025-09-02 PROCEDURE — 99213 OFFICE O/P EST LOW 20 MIN: CPT | Mod: 25,S$GLB,,

## 2025-09-02 PROCEDURE — 83036 HEMOGLOBIN GLYCOSYLATED A1C: CPT

## 2025-09-02 RX ORDER — KETOROLAC TROMETHAMINE 30 MG/ML
30 INJECTION, SOLUTION INTRAMUSCULAR; INTRAVENOUS
Status: COMPLETED | OUTPATIENT
Start: 2025-09-02 | End: 2025-09-02

## 2025-09-02 RX ORDER — TRAMADOL HYDROCHLORIDE 50 MG/1
50 TABLET, FILM COATED ORAL EVERY 12 HOURS PRN
Qty: 14 TABLET | Refills: 0 | Status: SHIPPED | OUTPATIENT
Start: 2025-09-02 | End: 2025-09-09

## 2025-09-02 RX ORDER — VIT C/E/ZN/COPPR/LUTEIN/ZEAXAN 250MG-90MG
1 CAPSULE ORAL WEEKLY
Qty: 4 TABLET | Refills: 11 | Status: SHIPPED | OUTPATIENT
Start: 2025-09-02

## 2025-09-02 RX ADMIN — KETOROLAC TROMETHAMINE 30 MG: 30 INJECTION, SOLUTION INTRAMUSCULAR; INTRAVENOUS at 10:09
